# Patient Record
Sex: MALE | Race: WHITE | Employment: OTHER | URBAN - METROPOLITAN AREA
[De-identification: names, ages, dates, MRNs, and addresses within clinical notes are randomized per-mention and may not be internally consistent; named-entity substitution may affect disease eponyms.]

---

## 2019-01-01 ENCOUNTER — OFFICE VISIT (OUTPATIENT)
Dept: GERIATRIC MEDICINE | Age: 69
End: 2019-01-01
Payer: MEDICARE

## 2019-01-01 ENCOUNTER — APPOINTMENT (OUTPATIENT)
Dept: INTERVENTIONAL RADIOLOGY/VASCULAR | Age: 69
DRG: 871 | End: 2019-01-01
Attending: INTERNAL MEDICINE
Payer: MEDICARE

## 2019-01-01 ENCOUNTER — HOSPITAL ENCOUNTER (INPATIENT)
Age: 69
LOS: 7 days | Discharge: NURSING, REHAB OR LONG-TERM CARE | DRG: 871 | End: 2019-02-27
Attending: INTERNAL MEDICINE | Admitting: INTERNAL MEDICINE
Payer: MEDICARE

## 2019-01-01 ENCOUNTER — APPOINTMENT (OUTPATIENT)
Dept: GENERAL RADIOLOGY | Age: 69
End: 2019-01-01
Payer: MEDICARE

## 2019-01-01 ENCOUNTER — HOSPITAL ENCOUNTER (EMERGENCY)
Age: 69
Discharge: STILL ADMITTED OR ADMINISTRATIVE | End: 2019-02-20
Attending: EMERGENCY MEDICINE
Payer: MEDICARE

## 2019-01-01 ENCOUNTER — HOSPITAL ENCOUNTER (INPATIENT)
Age: 69
LOS: 6 days | Discharge: HOME | DRG: 699 | End: 2019-04-08
Attending: EMERGENCY MEDICINE | Admitting: INTERNAL MEDICINE
Payer: MEDICARE

## 2019-01-01 ENCOUNTER — APPOINTMENT (OUTPATIENT)
Dept: GENERAL RADIOLOGY | Age: 69
DRG: 871 | End: 2019-01-01
Attending: INTERNAL MEDICINE
Payer: MEDICARE

## 2019-01-01 ENCOUNTER — HOSPITAL ENCOUNTER (EMERGENCY)
Age: 69
Discharge: HOME | End: 2019-04-18
Attending: EMERGENCY MEDICINE
Payer: MEDICARE

## 2019-01-01 ENCOUNTER — TELEPHONE (OUTPATIENT)
Dept: OTHER | Facility: CLINIC | Age: 69
End: 2019-01-01

## 2019-01-01 ENCOUNTER — HOSPITAL ENCOUNTER (EMERGENCY)
Age: 69
Discharge: HOME | End: 2019-04-25
Attending: EMERGENCY MEDICINE
Payer: MEDICARE

## 2019-01-01 ENCOUNTER — HOSPITAL ENCOUNTER (EMERGENCY)
Age: 69
Discharge: HOME | End: 2019-04-01
Attending: EMERGENCY MEDICINE
Payer: MEDICARE

## 2019-01-01 ENCOUNTER — HOSPITAL ENCOUNTER (OUTPATIENT)
Dept: INPATIENT UNIT | Age: 69
Discharge: HOME | End: 2019-05-15
Payer: MEDICARE

## 2019-01-01 ENCOUNTER — APPOINTMENT (OUTPATIENT)
Dept: GENERAL RADIOLOGY | Age: 69
DRG: 699 | End: 2019-01-01
Payer: MEDICARE

## 2019-01-01 ENCOUNTER — HOSPITAL ENCOUNTER (EMERGENCY)
Age: 69
End: 2019-07-12
Attending: EMERGENCY MEDICINE
Payer: MEDICARE

## 2019-01-01 ENCOUNTER — HOSPITAL ENCOUNTER (EMERGENCY)
Age: 69
Discharge: HOME | End: 2019-04-09
Attending: INTERNAL MEDICINE
Payer: MEDICARE

## 2019-01-01 ENCOUNTER — TELEPHONE (OUTPATIENT)
Dept: INFECTIOUS DISEASES | Age: 69
End: 2019-01-01

## 2019-01-01 VITALS
WEIGHT: 153.66 LBS | TEMPERATURE: 97.2 F | RESPIRATION RATE: 20 BRPM | SYSTOLIC BLOOD PRESSURE: 142 MMHG | DIASTOLIC BLOOD PRESSURE: 73 MMHG | HEART RATE: 82 BPM | OXYGEN SATURATION: 100 % | HEIGHT: 60 IN | BODY MASS INDEX: 30.17 KG/M2

## 2019-01-01 VITALS
RESPIRATION RATE: 18 BRPM | DIASTOLIC BLOOD PRESSURE: 56 MMHG | OXYGEN SATURATION: 98 % | HEART RATE: 74 BPM | TEMPERATURE: 96.8 F | SYSTOLIC BLOOD PRESSURE: 121 MMHG

## 2019-01-01 VITALS
BODY MASS INDEX: 26.45 KG/M2 | SYSTOLIC BLOOD PRESSURE: 100 MMHG | HEIGHT: 60 IN | OXYGEN SATURATION: 100 % | RESPIRATION RATE: 20 BRPM | TEMPERATURE: 98.1 F | DIASTOLIC BLOOD PRESSURE: 74 MMHG | WEIGHT: 134.7 LBS | HEART RATE: 82 BPM

## 2019-01-01 VITALS — RESPIRATION RATE: 16 BRPM | HEART RATE: 71 BPM | OXYGEN SATURATION: 91 % | TEMPERATURE: 97 F

## 2019-01-01 VITALS
OXYGEN SATURATION: 98 % | HEART RATE: 82 BPM | RESPIRATION RATE: 21 BRPM | DIASTOLIC BLOOD PRESSURE: 89 MMHG | SYSTOLIC BLOOD PRESSURE: 129 MMHG | TEMPERATURE: 98.5 F

## 2019-01-01 VITALS
HEIGHT: 62 IN | DIASTOLIC BLOOD PRESSURE: 61 MMHG | WEIGHT: 140 LBS | SYSTOLIC BLOOD PRESSURE: 109 MMHG | RESPIRATION RATE: 20 BRPM | HEART RATE: 106 BPM | OXYGEN SATURATION: 95 % | TEMPERATURE: 99.3 F | BODY MASS INDEX: 25.76 KG/M2

## 2019-01-01 VITALS
SYSTOLIC BLOOD PRESSURE: 91 MMHG | DIASTOLIC BLOOD PRESSURE: 53 MMHG | WEIGHT: 140 LBS | TEMPERATURE: 100.4 F | HEIGHT: 60 IN | HEART RATE: 93 BPM | BODY MASS INDEX: 27.48 KG/M2 | OXYGEN SATURATION: 99 % | RESPIRATION RATE: 24 BRPM

## 2019-01-01 VITALS
OXYGEN SATURATION: 94 % | HEART RATE: 55 BPM | HEIGHT: 60 IN | DIASTOLIC BLOOD PRESSURE: 72 MMHG | BODY MASS INDEX: 25.23 KG/M2 | RESPIRATION RATE: 18 BRPM | SYSTOLIC BLOOD PRESSURE: 107 MMHG | WEIGHT: 128.5 LBS | TEMPERATURE: 96.5 F

## 2019-01-01 VITALS
DIASTOLIC BLOOD PRESSURE: 75 MMHG | TEMPERATURE: 96.5 F | HEART RATE: 70 BPM | OXYGEN SATURATION: 97 % | RESPIRATION RATE: 18 BRPM | SYSTOLIC BLOOD PRESSURE: 110 MMHG

## 2019-01-01 VITALS
TEMPERATURE: 98.4 F | OXYGEN SATURATION: 98 % | HEART RATE: 77 BPM | SYSTOLIC BLOOD PRESSURE: 100 MMHG | RESPIRATION RATE: 20 BRPM | DIASTOLIC BLOOD PRESSURE: 60 MMHG

## 2019-01-01 VITALS — TEMPERATURE: 91.3 F

## 2019-01-01 DIAGNOSIS — Z93.1 G TUBE FEEDINGS (HCC): ICD-10-CM

## 2019-01-01 DIAGNOSIS — K21.9 GASTROESOPHAGEAL REFLUX DISEASE WITHOUT ESOPHAGITIS: ICD-10-CM

## 2019-01-01 DIAGNOSIS — J32.9 BACTERIAL SINUSITIS: Primary | ICD-10-CM

## 2019-01-01 DIAGNOSIS — G62.9 NEUROPATHY: ICD-10-CM

## 2019-01-01 DIAGNOSIS — Z93.0 PRESENCE OF TRACHEOSTOMY (HCC): ICD-10-CM

## 2019-01-01 DIAGNOSIS — L03.311 ABDOMINAL WALL CELLULITIS: ICD-10-CM

## 2019-01-01 DIAGNOSIS — E78.2 MIXED HYPERLIPIDEMIA: ICD-10-CM

## 2019-01-01 DIAGNOSIS — K21.9 GASTROESOPHAGEAL REFLUX DISEASE, ESOPHAGITIS PRESENCE NOT SPECIFIED: ICD-10-CM

## 2019-01-01 DIAGNOSIS — R06.00 DYSPNEA AND RESPIRATORY ABNORMALITIES: ICD-10-CM

## 2019-01-01 DIAGNOSIS — I10 ESSENTIAL HYPERTENSION: ICD-10-CM

## 2019-01-01 DIAGNOSIS — J18.9 PNEUMONIA DUE TO ORGANISM: Primary | ICD-10-CM

## 2019-01-01 DIAGNOSIS — R09.02 HYPOXIA: ICD-10-CM

## 2019-01-01 DIAGNOSIS — R06.89 DYSPNEA AND RESPIRATORY ABNORMALITIES: ICD-10-CM

## 2019-01-01 DIAGNOSIS — Z86.718 HISTORY OF DVT OF LOWER EXTREMITY: ICD-10-CM

## 2019-01-01 DIAGNOSIS — K59.01 SLOW TRANSIT CONSTIPATION: ICD-10-CM

## 2019-01-01 DIAGNOSIS — J04.10 ACUTE TRACHEITIS WITHOUT AIRWAY OBSTRUCTION: Primary | ICD-10-CM

## 2019-01-01 DIAGNOSIS — G80.1 SPASTIC DIPLEGIC CEREBRAL PALSY (HCC): ICD-10-CM

## 2019-01-01 DIAGNOSIS — H90.3 SENSORINEURAL HEARING LOSS (SNHL) OF BOTH EARS: ICD-10-CM

## 2019-01-01 DIAGNOSIS — J95.00 TRACHEOSTOMY COMPLICATION, UNSPECIFIED COMPLICATION TYPE (HCC): Primary | ICD-10-CM

## 2019-01-01 DIAGNOSIS — R06.00 DYSPNEA, UNSPECIFIED TYPE: Primary | ICD-10-CM

## 2019-01-01 DIAGNOSIS — Z93.1 STATUS POST INSERTION OF PERCUTANEOUS ENDOSCOPIC GASTROSTOMY (PEG) TUBE (HCC): ICD-10-CM

## 2019-01-01 DIAGNOSIS — Z79.2 LONG TERM (CURRENT) USE OF ANTIBIOTICS: ICD-10-CM

## 2019-01-01 DIAGNOSIS — F79 DISORDER OF INTELLECTUAL DEVELOPMENT: Primary | ICD-10-CM

## 2019-01-01 DIAGNOSIS — N30.00 ACUTE CYSTITIS WITHOUT HEMATURIA: ICD-10-CM

## 2019-01-01 DIAGNOSIS — R09.02 HYPOXIA: Primary | ICD-10-CM

## 2019-01-01 DIAGNOSIS — M81.0 AGE-RELATED OSTEOPOROSIS WITHOUT CURRENT PATHOLOGICAL FRACTURE: ICD-10-CM

## 2019-01-01 DIAGNOSIS — Z86.711 PERSONAL HISTORY OF PE (PULMONARY EMBOLISM): ICD-10-CM

## 2019-01-01 DIAGNOSIS — R13.12 OROPHARYNGEAL DYSPHAGIA: ICD-10-CM

## 2019-01-01 DIAGNOSIS — J18.9 PNEUMONIA DUE TO ORGANISM: ICD-10-CM

## 2019-01-01 DIAGNOSIS — Z87.01 HX OF BACTERIAL PNEUMONIA: Primary | ICD-10-CM

## 2019-01-01 DIAGNOSIS — E55.9 VITAMIN D DEFICIENCY: ICD-10-CM

## 2019-01-01 DIAGNOSIS — Z93.0 TRACHEOSTOMY STATUS (HCC): ICD-10-CM

## 2019-01-01 DIAGNOSIS — M41.05 INFANTILE IDIOPATHIC SCOLIOSIS OF THORACOLUMBAR REGION: ICD-10-CM

## 2019-01-01 DIAGNOSIS — Z87.440 HISTORY OF UTI: ICD-10-CM

## 2019-01-01 DIAGNOSIS — N39.0 RECURRENT UTI: Primary | ICD-10-CM

## 2019-01-01 DIAGNOSIS — Z87.09 HX OF BRONCHITIS: ICD-10-CM

## 2019-01-01 DIAGNOSIS — Z93.0 STATUS POST TRACHEOSTOMY (HCC): ICD-10-CM

## 2019-01-01 DIAGNOSIS — Z87.81 STATUS POST CLOSED FRACTURE OF LEFT FEMUR: ICD-10-CM

## 2019-01-01 DIAGNOSIS — Z43.0 TRACHEOSTOMY CARE (HCC): ICD-10-CM

## 2019-01-01 DIAGNOSIS — R50.9 FEBRILE ILLNESS, ACUTE: ICD-10-CM

## 2019-01-01 DIAGNOSIS — Z87.898 HISTORY OF SEIZURES: ICD-10-CM

## 2019-01-01 DIAGNOSIS — J18.9 PNEUMONIA OF BOTH LUNGS DUE TO INFECTIOUS ORGANISM, UNSPECIFIED PART OF LUNG: Primary | ICD-10-CM

## 2019-01-01 DIAGNOSIS — Z98.890 S/P TRACHEOPLASTY: ICD-10-CM

## 2019-01-01 DIAGNOSIS — Z78.9 LACK OF INTRAVENOUS ACCESS: Primary | ICD-10-CM

## 2019-01-01 DIAGNOSIS — G40.909 SEIZURE DISORDER (HCC): ICD-10-CM

## 2019-01-01 DIAGNOSIS — B96.89 BACTERIAL SINUSITIS: Primary | ICD-10-CM

## 2019-01-01 DIAGNOSIS — R13.12 OROPHARYNGEAL DYSPHAGIA: Primary | ICD-10-CM

## 2019-01-01 DIAGNOSIS — G62.9 NEUROPATHY: Primary | ICD-10-CM

## 2019-01-01 LAB
ACANTHOCYTES: 0
ALBUMIN SERPL-MCNC: 3.5 G/DL (ref 3.5–4.6)
ALBUMIN SERPL-MCNC: 3.5 G/DL (ref 3.5–4.6)
ALBUMIN SERPL-MCNC: 3.6 G/DL (ref 3.5–4.6)
ALBUMIN SERPL-MCNC: 3.7 G/DL (ref 3.5–4.6)
ALBUMIN SERPL-MCNC: 3.9 G/DL (ref 3.5–4.6)
ALBUMIN SERPL-MCNC: 4 G/DL (ref 3.5–4.6)
ALP BLD-CCNC: 100 U/L (ref 35–104)
ALP BLD-CCNC: 101 U/L (ref 35–104)
ALP BLD-CCNC: 84 U/L (ref 35–104)
ALT SERPL-CCNC: 16 U/L (ref 0–41)
ALT SERPL-CCNC: 17 U/L (ref 0–41)
ALT SERPL-CCNC: 17 U/L (ref 0–41)
AMORPHOUS: ABNORMAL
ANION GAP SERPL CALCULATED.3IONS-SCNC: 10 MEQ/L (ref 9–15)
ANION GAP SERPL CALCULATED.3IONS-SCNC: 11 MEQ/L (ref 9–15)
ANION GAP SERPL CALCULATED.3IONS-SCNC: 11 MEQ/L (ref 9–15)
ANION GAP SERPL CALCULATED.3IONS-SCNC: 12 MEQ/L (ref 9–15)
ANION GAP SERPL CALCULATED.3IONS-SCNC: 13 MEQ/L (ref 9–15)
ANION GAP SERPL CALCULATED.3IONS-SCNC: 14 MEQ/L (ref 9–15)
ANION GAP SERPL CALCULATED.3IONS-SCNC: 15 MEQ/L (ref 9–15)
ANISOCYTOSIS: 0
AST SERPL-CCNC: 20 U/L (ref 0–40)
AST SERPL-CCNC: 24 U/L (ref 0–40)
AST SERPL-CCNC: 30 U/L (ref 0–40)
AUER RODS: 0
BACTERIA: ABNORMAL /HPF
BACTERIA: ABNORMAL /HPF
BANDED NEUTROPHILS RELATIVE PERCENT: 3 %
BASE EXCESS ARTERIAL: -1 (ref -3–3)
BASE EXCESS ARTERIAL: -1 (ref -3–3)
BASOPHILIC STIPPLING: 0
BASOPHILS ABSOLUTE: 0 K/UL (ref 0–0.2)
BASOPHILS ABSOLUTE: 0.1 K/UL (ref 0–0.2)
BASOPHILS RELATIVE PERCENT: 0.1 %
BASOPHILS RELATIVE PERCENT: 0.2 %
BASOPHILS RELATIVE PERCENT: 0.3 %
BASOPHILS RELATIVE PERCENT: 0.3 %
BASOPHILS RELATIVE PERCENT: 0.5 %
BASOPHILS RELATIVE PERCENT: 0.8 %
BASOPHILS RELATIVE PERCENT: 0.8 %
BASOPHILS RELATIVE PERCENT: 0.9 %
BASOPHILS RELATIVE PERCENT: 0.9 %
BASOPHILS RELATIVE PERCENT: 1 %
BASOPHILS RELATIVE PERCENT: 1.2 %
BILIRUB SERPL-MCNC: <0.2 MG/DL (ref 0.2–0.7)
BILIRUBIN URINE: NEGATIVE
BILIRUBIN URINE: NEGATIVE
BLOOD CULTURE, ROUTINE: NORMAL
BLOOD, URINE: ABNORMAL
BLOOD, URINE: ABNORMAL
BUN BLDV-MCNC: 10 MG/DL (ref 8–23)
BUN BLDV-MCNC: 10 MG/DL (ref 8–23)
BUN BLDV-MCNC: 13 MG/DL (ref 8–23)
BUN BLDV-MCNC: 14 MG/DL (ref 8–23)
BUN BLDV-MCNC: 17 MG/DL (ref 8–23)
BUN BLDV-MCNC: 18 MG/DL (ref 8–23)
BUN BLDV-MCNC: 19 MG/DL (ref 8–23)
BUN BLDV-MCNC: 21 MG/DL (ref 8–23)
BUN BLDV-MCNC: 5 MG/DL (ref 8–23)
BUN BLDV-MCNC: 6 MG/DL (ref 8–23)
BUN BLDV-MCNC: 7 MG/DL (ref 8–23)
BUN BLDV-MCNC: 8 MG/DL (ref 8–23)
BUN BLDV-MCNC: 9 MG/DL (ref 8–23)
BURR CELLS: 0
C-REACTIVE PROTEIN, HIGH SENSITIVITY: 14.3 MG/L (ref 0–5)
CABOT RINGS: 0
CALCIUM IONIZED: 1.06 MMOL/L (ref 1.12–1.32)
CALCIUM IONIZED: 1.06 MMOL/L (ref 1.12–1.32)
CALCIUM SERPL-MCNC: 5.8 MG/DL (ref 8.5–9.9)
CALCIUM SERPL-MCNC: 6.8 MG/DL (ref 8.5–9.9)
CALCIUM SERPL-MCNC: 7.2 MG/DL (ref 8.5–9.9)
CALCIUM SERPL-MCNC: 7.2 MG/DL (ref 8.5–9.9)
CALCIUM SERPL-MCNC: 7.6 MG/DL (ref 8.5–9.9)
CALCIUM SERPL-MCNC: 7.7 MG/DL (ref 8.5–9.9)
CALCIUM SERPL-MCNC: 7.7 MG/DL (ref 8.5–9.9)
CALCIUM SERPL-MCNC: 7.8 MG/DL (ref 8.5–9.9)
CALCIUM SERPL-MCNC: 8.3 MG/DL (ref 8.5–9.9)
CALCIUM SERPL-MCNC: 8.3 MG/DL (ref 8.5–9.9)
CALCIUM SERPL-MCNC: 8.4 MG/DL (ref 8.5–9.9)
CALCIUM SERPL-MCNC: 8.5 MG/DL (ref 8.5–9.9)
CALCIUM SERPL-MCNC: 8.6 MG/DL (ref 8.5–9.9)
CALCIUM SERPL-MCNC: 8.7 MG/DL (ref 8.5–9.9)
CALCIUM SERPL-MCNC: 9 MG/DL (ref 8.5–9.9)
CALCIUM SERPL-MCNC: 9.4 MG/DL (ref 8.5–9.9)
CALCIUM SERPL-MCNC: 9.6 MG/DL (ref 8.5–9.9)
CHLORIDE BLD-SCNC: 100 MEQ/L (ref 95–107)
CHLORIDE BLD-SCNC: 101 MEQ/L (ref 95–107)
CHLORIDE BLD-SCNC: 101 MEQ/L (ref 95–107)
CHLORIDE BLD-SCNC: 102 MEQ/L (ref 95–107)
CHLORIDE BLD-SCNC: 102 MEQ/L (ref 95–107)
CHLORIDE BLD-SCNC: 103 MEQ/L (ref 95–107)
CHLORIDE BLD-SCNC: 105 MEQ/L (ref 95–107)
CHLORIDE BLD-SCNC: 106 MEQ/L (ref 95–107)
CHLORIDE BLD-SCNC: 114 MEQ/L (ref 95–107)
CHLORIDE BLD-SCNC: 98 MEQ/L (ref 95–107)
CHLORIDE BLD-SCNC: 99 MEQ/L (ref 95–107)
CHLORIDE BLD-SCNC: 99 MEQ/L (ref 95–107)
CLARITY: ABNORMAL
CLARITY: CLEAR
CO2: 21 MEQ/L (ref 20–31)
CO2: 22 MEQ/L (ref 20–31)
CO2: 23 MEQ/L (ref 20–31)
CO2: 25 MEQ/L (ref 20–31)
CO2: 25 MEQ/L (ref 20–31)
CO2: 26 MEQ/L (ref 20–31)
CO2: 26 MEQ/L (ref 20–31)
CO2: 27 MEQ/L (ref 20–31)
CO2: 28 MEQ/L (ref 20–31)
CO2: 29 MEQ/L (ref 20–31)
CO2: 29 MEQ/L (ref 20–31)
CO2: 30 MEQ/L (ref 20–31)
CO2: 30 MEQ/L (ref 20–31)
CO2: 31 MEQ/L (ref 20–31)
COLOR: YELLOW
COLOR: YELLOW
CREAT SERPL-MCNC: 0.22 MG/DL (ref 0.7–1.2)
CREAT SERPL-MCNC: 0.27 MG/DL (ref 0.7–1.2)
CREAT SERPL-MCNC: 0.28 MG/DL (ref 0.7–1.2)
CREAT SERPL-MCNC: 0.28 MG/DL (ref 0.7–1.2)
CREAT SERPL-MCNC: 0.3 MG/DL (ref 0.7–1.2)
CREAT SERPL-MCNC: 0.32 MG/DL (ref 0.7–1.2)
CREAT SERPL-MCNC: 0.33 MG/DL (ref 0.7–1.2)
CREAT SERPL-MCNC: 0.34 MG/DL (ref 0.7–1.2)
CREAT SERPL-MCNC: 0.35 MG/DL (ref 0.7–1.2)
CREAT SERPL-MCNC: 0.36 MG/DL (ref 0.7–1.2)
CREAT SERPL-MCNC: 0.37 MG/DL (ref 0.7–1.2)
CREAT SERPL-MCNC: 0.38 MG/DL (ref 0.7–1.2)
CREAT SERPL-MCNC: 0.46 MG/DL (ref 0.7–1.2)
CREAT SERPL-MCNC: 0.48 MG/DL (ref 0.7–1.2)
CULTURE, BLOOD 2: NORMAL
CULTURE, BLOOD 2: NORMAL
CULTURE, RESPIRATORY: ABNORMAL
DOHLE BODIES: 0
EKG ATRIAL RATE: 65 BPM
EKG ATRIAL RATE: 71 BPM
EKG ATRIAL RATE: 77 BPM
EKG P AXIS: 40 DEGREES
EKG P AXIS: 44 DEGREES
EKG P AXIS: 6 DEGREES
EKG P-R INTERVAL: 158 MS
EKG P-R INTERVAL: 160 MS
EKG P-R INTERVAL: 176 MS
EKG Q-T INTERVAL: 376 MS
EKG Q-T INTERVAL: 414 MS
EKG Q-T INTERVAL: 426 MS
EKG QRS DURATION: 66 MS
EKG QRS DURATION: 72 MS
EKG QRS DURATION: 72 MS
EKG QTC CALCULATION (BAZETT): 425 MS
EKG QTC CALCULATION (BAZETT): 443 MS
EKG QTC CALCULATION (BAZETT): 449 MS
EKG R AXIS: -2 DEGREES
EKG R AXIS: -2 DEGREES
EKG R AXIS: 28 DEGREES
EKG T AXIS: 18 DEGREES
EKG T AXIS: 41 DEGREES
EKG T AXIS: 87 DEGREES
EKG VENTRICULAR RATE: 65 BPM
EKG VENTRICULAR RATE: 71 BPM
EKG VENTRICULAR RATE: 77 BPM
EOSINOPHILS ABSOLUTE: 0 K/UL (ref 0–0.7)
EOSINOPHILS ABSOLUTE: 0 K/UL (ref 0–0.7)
EOSINOPHILS ABSOLUTE: 0.1 K/UL (ref 0–0.7)
EOSINOPHILS ABSOLUTE: 0.1 K/UL (ref 0–0.7)
EOSINOPHILS ABSOLUTE: 0.3 K/UL (ref 0–0.7)
EOSINOPHILS ABSOLUTE: 0.4 K/UL (ref 0–0.7)
EOSINOPHILS ABSOLUTE: 0.6 K/UL (ref 0–0.7)
EOSINOPHILS RELATIVE PERCENT: 0.3 %
EOSINOPHILS RELATIVE PERCENT: 0.3 %
EOSINOPHILS RELATIVE PERCENT: 0.4 %
EOSINOPHILS RELATIVE PERCENT: 1 %
EOSINOPHILS RELATIVE PERCENT: 3.6 %
EOSINOPHILS RELATIVE PERCENT: 3.8 %
EOSINOPHILS RELATIVE PERCENT: 4.4 %
EOSINOPHILS RELATIVE PERCENT: 4.4 %
EOSINOPHILS RELATIVE PERCENT: 5.2 %
EOSINOPHILS RELATIVE PERCENT: 5.2 %
EOSINOPHILS RELATIVE PERCENT: 5.5 %
EPITHELIAL CELLS, UA: ABNORMAL /HPF
EPITHELIAL CELLS, UA: ABNORMAL /HPF (ref 0–5)
GFR AFRICAN AMERICAN: >60
GFR NON-AFRICAN AMERICAN: >60
GLOBULIN: 2.5 G/DL (ref 2.3–3.5)
GLOBULIN: 3.4 G/DL (ref 2.3–3.5)
GLOBULIN: 3.4 G/DL (ref 2.3–3.5)
GLUCOSE BLD-MCNC: 115 MG/DL (ref 70–99)
GLUCOSE BLD-MCNC: 120 MG/DL (ref 70–99)
GLUCOSE BLD-MCNC: 120 MG/DL (ref 70–99)
GLUCOSE BLD-MCNC: 121 MG/DL (ref 70–99)
GLUCOSE BLD-MCNC: 122 MG/DL (ref 70–99)
GLUCOSE BLD-MCNC: 126 MG/DL (ref 60–115)
GLUCOSE BLD-MCNC: 127 MG/DL (ref 70–99)
GLUCOSE BLD-MCNC: 128 MG/DL (ref 70–99)
GLUCOSE BLD-MCNC: 129 MG/DL (ref 60–115)
GLUCOSE BLD-MCNC: 130 MG/DL (ref 70–99)
GLUCOSE BLD-MCNC: 132 MG/DL (ref 70–99)
GLUCOSE BLD-MCNC: 136 MG/DL (ref 70–99)
GLUCOSE BLD-MCNC: 140 MG/DL (ref 70–99)
GLUCOSE BLD-MCNC: 148 MG/DL (ref 60–115)
GLUCOSE BLD-MCNC: 152 MG/DL (ref 70–99)
GLUCOSE BLD-MCNC: 171 MG/DL (ref 60–115)
GLUCOSE BLD-MCNC: 85 MG/DL (ref 70–99)
GLUCOSE BLD-MCNC: 92 MG/DL (ref 60–115)
GLUCOSE BLD-MCNC: 92 MG/DL (ref 70–99)
GLUCOSE BLD-MCNC: 92 MG/DL (ref 70–99)
GLUCOSE BLD-MCNC: 96 MG/DL (ref 70–99)
GLUCOSE BLD-MCNC: 99 MG/DL (ref 70–99)
GLUCOSE URINE: NEGATIVE MG/DL
GLUCOSE URINE: NEGATIVE MG/DL
GRAM STAIN RESULT: ABNORMAL
GRAM STAIN RESULT: ABNORMAL
HAIRY CELLS: 0
HCO3 ARTERIAL: 24.1 MMOL/L (ref 21–29)
HCO3 ARTERIAL: 24.6 MMOL/L (ref 21–29)
HCT VFR BLD CALC: 29 % (ref 42–52)
HCT VFR BLD CALC: 31.9 % (ref 42–52)
HCT VFR BLD CALC: 32.2 % (ref 42–52)
HCT VFR BLD CALC: 32.4 % (ref 42–52)
HCT VFR BLD CALC: 32.7 % (ref 42–52)
HCT VFR BLD CALC: 32.8 % (ref 42–52)
HCT VFR BLD CALC: 33.3 % (ref 42–52)
HCT VFR BLD CALC: 34 % (ref 42–52)
HCT VFR BLD CALC: 34.2 % (ref 42–52)
HCT VFR BLD CALC: 34.6 % (ref 42–52)
HCT VFR BLD CALC: 34.9 % (ref 42–52)
HCT VFR BLD CALC: 35 % (ref 42–52)
HCT VFR BLD CALC: 36.3 % (ref 42–52)
HCT VFR BLD CALC: 36.8 % (ref 42–52)
HCT VFR BLD CALC: 37.8 % (ref 42–52)
HCT VFR BLD CALC: 38.6 % (ref 42–52)
HEMOGLOBIN: 10.9 G/DL (ref 14–18)
HEMOGLOBIN: 10.9 G/DL (ref 14–18)
HEMOGLOBIN: 11 G/DL (ref 14–18)
HEMOGLOBIN: 11 G/DL (ref 14–18)
HEMOGLOBIN: 11.1 G/DL (ref 14–18)
HEMOGLOBIN: 11.2 G/DL (ref 14–18)
HEMOGLOBIN: 11.4 G/DL (ref 14–18)
HEMOGLOBIN: 11.5 GM/DL (ref 13.5–17.5)
HEMOGLOBIN: 11.6 G/DL (ref 14–18)
HEMOGLOBIN: 11.6 G/DL (ref 14–18)
HEMOGLOBIN: 11.7 G/DL (ref 14–18)
HEMOGLOBIN: 11.8 G/DL (ref 14–18)
HEMOGLOBIN: 11.8 GM/DL (ref 13.5–17.5)
HEMOGLOBIN: 12.3 G/DL (ref 14–18)
HEMOGLOBIN: 12.4 G/DL (ref 14–18)
HEMOGLOBIN: 12.9 G/DL (ref 14–18)
HEMOGLOBIN: 13 G/DL (ref 14–18)
HEMOGLOBIN: 9.9 G/DL (ref 14–18)
HOWELL-JOLLY BODIES: 0
HYALINE CASTS: ABNORMAL /HPF (ref 0–5)
HYPERSEGMENTED NEUTROPHILS: 0
HYPOCHROMIA: 0
KETONES, URINE: NEGATIVE MG/DL
KETONES, URINE: NEGATIVE MG/DL
LACTATE: 0.74 MMOL/L (ref 0.4–2)
LACTATE: 0.94 MMOL/L (ref 0.4–2)
LACTIC ACID, SEPSIS: 0.4 MMOL/L (ref 0.5–1.9)
LACTIC ACID, SEPSIS: 0.6 MMOL/L (ref 0.5–1.9)
LACTIC ACID: 0.6 MMOL/L (ref 0.5–2.2)
LACTIC ACID: 1.9 MMOL/L (ref 0.5–2.2)
LEUKOCYTE ESTERASE, URINE: ABNORMAL
LEUKOCYTE ESTERASE, URINE: NEGATIVE
LYMPHOCYTES ABSOLUTE: 0.6 K/UL (ref 1–4.8)
LYMPHOCYTES ABSOLUTE: 0.9 K/UL (ref 1–4.8)
LYMPHOCYTES ABSOLUTE: 1 K/UL (ref 1–4.8)
LYMPHOCYTES ABSOLUTE: 1.1 K/UL (ref 1–4.8)
LYMPHOCYTES ABSOLUTE: 1.1 K/UL (ref 1–4.8)
LYMPHOCYTES ABSOLUTE: 1.2 K/UL (ref 1–4.8)
LYMPHOCYTES ABSOLUTE: 1.7 K/UL (ref 1–4.8)
LYMPHOCYTES ABSOLUTE: 2.2 K/UL (ref 1–4.8)
LYMPHOCYTES ABSOLUTE: 2.5 K/UL (ref 1–4.8)
LYMPHOCYTES ABSOLUTE: 2.9 K/UL (ref 1–4.8)
LYMPHOCYTES ABSOLUTE: 3 K/UL (ref 1–4.8)
LYMPHOCYTES RELATIVE PERCENT: 11.1 %
LYMPHOCYTES RELATIVE PERCENT: 15.7 %
LYMPHOCYTES RELATIVE PERCENT: 19.3 %
LYMPHOCYTES RELATIVE PERCENT: 19.5 %
LYMPHOCYTES RELATIVE PERCENT: 20.5 %
LYMPHOCYTES RELATIVE PERCENT: 22.2 %
LYMPHOCYTES RELATIVE PERCENT: 26.6 %
LYMPHOCYTES RELATIVE PERCENT: 27 %
LYMPHOCYTES RELATIVE PERCENT: 4.7 %
LYMPHOCYTES RELATIVE PERCENT: 5 %
LYMPHOCYTES RELATIVE PERCENT: 6.5 %
MACROCYTES: 0
MAGNESIUM: 1.7 MG/DL (ref 1.7–2.4)
MAGNESIUM: 2 MG/DL (ref 1.7–2.4)
MAGNESIUM: 2 MG/DL (ref 1.7–2.4)
MAGNESIUM: 2.1 MG/DL (ref 1.7–2.4)
MAGNESIUM: 2.3 MG/DL (ref 1.7–2.4)
MAGNESIUM: 2.3 MG/DL (ref 1.7–2.4)
MAGNESIUM: 2.4 MG/DL (ref 1.7–2.4)
MCH RBC QN AUTO: 30.9 PG (ref 27–31.3)
MCH RBC QN AUTO: 31.2 PG (ref 27–31.3)
MCH RBC QN AUTO: 31.3 PG (ref 27–31.3)
MCH RBC QN AUTO: 31.3 PG (ref 27–31.3)
MCH RBC QN AUTO: 31.4 PG (ref 27–31.3)
MCH RBC QN AUTO: 31.4 PG (ref 27–31.3)
MCH RBC QN AUTO: 31.5 PG (ref 27–31.3)
MCH RBC QN AUTO: 31.7 PG (ref 27–31.3)
MCH RBC QN AUTO: 31.7 PG (ref 27–31.3)
MCH RBC QN AUTO: 31.8 PG (ref 27–31.3)
MCH RBC QN AUTO: 31.9 PG (ref 27–31.3)
MCH RBC QN AUTO: 31.9 PG (ref 27–31.3)
MCH RBC QN AUTO: 32.1 PG (ref 27–31.3)
MCHC RBC AUTO-ENTMCNC: 32.8 % (ref 33–37)
MCHC RBC AUTO-ENTMCNC: 33.5 % (ref 33–37)
MCHC RBC AUTO-ENTMCNC: 33.5 % (ref 33–37)
MCHC RBC AUTO-ENTMCNC: 33.6 % (ref 33–37)
MCHC RBC AUTO-ENTMCNC: 33.7 % (ref 33–37)
MCHC RBC AUTO-ENTMCNC: 33.9 % (ref 33–37)
MCHC RBC AUTO-ENTMCNC: 34.1 % (ref 33–37)
MCHC RBC AUTO-ENTMCNC: 34.1 % (ref 33–37)
MCHC RBC AUTO-ENTMCNC: 34.2 % (ref 33–37)
MCHC RBC AUTO-ENTMCNC: 35 % (ref 33–37)
MCV RBC AUTO: 91.7 FL (ref 80–100)
MCV RBC AUTO: 91.8 FL (ref 80–100)
MCV RBC AUTO: 92.8 FL (ref 80–100)
MCV RBC AUTO: 93 FL (ref 80–100)
MCV RBC AUTO: 93.2 FL (ref 80–100)
MCV RBC AUTO: 93.3 FL (ref 80–100)
MCV RBC AUTO: 93.4 FL (ref 80–100)
MCV RBC AUTO: 93.5 FL (ref 80–100)
MCV RBC AUTO: 93.7 FL (ref 80–100)
MCV RBC AUTO: 94 FL (ref 80–100)
MCV RBC AUTO: 96 FL (ref 80–100)
MICROCYTES: 0
MONOCYTES ABSOLUTE: 1.1 K/UL (ref 0.2–0.8)
MONOCYTES ABSOLUTE: 1.2 K/UL (ref 0.2–0.8)
MONOCYTES ABSOLUTE: 1.2 K/UL (ref 0.2–0.8)
MONOCYTES ABSOLUTE: 1.3 K/UL (ref 0.2–0.8)
MONOCYTES ABSOLUTE: 1.3 K/UL (ref 0.2–0.8)
MONOCYTES ABSOLUTE: 1.5 K/UL (ref 0.2–0.8)
MONOCYTES ABSOLUTE: 1.5 K/UL (ref 0.2–0.8)
MONOCYTES ABSOLUTE: 1.6 K/UL (ref 0.2–0.8)
MONOCYTES ABSOLUTE: 1.9 K/UL (ref 0.2–0.8)
MONOCYTES RELATIVE PERCENT: 10.3 %
MONOCYTES RELATIVE PERCENT: 10.9 %
MONOCYTES RELATIVE PERCENT: 11.1 %
MONOCYTES RELATIVE PERCENT: 11.3 %
MONOCYTES RELATIVE PERCENT: 12 %
MONOCYTES RELATIVE PERCENT: 13.6 %
MONOCYTES RELATIVE PERCENT: 14.8 %
MONOCYTES RELATIVE PERCENT: 15.4 %
MONOCYTES RELATIVE PERCENT: 15.5 %
MONOCYTES RELATIVE PERCENT: 18.8 %
MONOCYTES RELATIVE PERCENT: 7.1 %
NEUTROPHILS ABSOLUTE: 10.9 K/UL (ref 1.4–6.5)
NEUTROPHILS ABSOLUTE: 13.2 K/UL (ref 1.4–6.5)
NEUTROPHILS ABSOLUTE: 14.7 K/UL (ref 1.4–6.5)
NEUTROPHILS ABSOLUTE: 3.5 K/UL (ref 1.4–6.5)
NEUTROPHILS ABSOLUTE: 4.3 K/UL (ref 1.4–6.5)
NEUTROPHILS ABSOLUTE: 4.7 K/UL (ref 1.4–6.5)
NEUTROPHILS ABSOLUTE: 5.9 K/UL (ref 1.4–6.5)
NEUTROPHILS ABSOLUTE: 6.4 K/UL (ref 1.4–6.5)
NEUTROPHILS ABSOLUTE: 6.6 K/UL (ref 1.4–6.5)
NEUTROPHILS ABSOLUTE: 7 K/UL (ref 1.4–6.5)
NEUTROPHILS ABSOLUTE: 8.2 K/UL (ref 1.4–6.5)
NEUTROPHILS RELATIVE PERCENT: 55.7 %
NEUTROPHILS RELATIVE PERCENT: 55.8 %
NEUTROPHILS RELATIVE PERCENT: 55.8 %
NEUTROPHILS RELATIVE PERCENT: 57.9 %
NEUTROPHILS RELATIVE PERCENT: 61.6 %
NEUTROPHILS RELATIVE PERCENT: 64.8 %
NEUTROPHILS RELATIVE PERCENT: 64.8 %
NEUTROPHILS RELATIVE PERCENT: 72.1 %
NEUTROPHILS RELATIVE PERCENT: 82 %
NEUTROPHILS RELATIVE PERCENT: 82.8 %
NEUTROPHILS RELATIVE PERCENT: 85.9 %
NITRITE, URINE: NEGATIVE
NITRITE, URINE: POSITIVE
O2 SAT, ARTERIAL: 92 % (ref 93–100)
O2 SAT, ARTERIAL: 94 % (ref 93–100)
ORGANISM: ABNORMAL
OVALOCYTES: 0
PAPPENHEIMER BODIES: 0
PCO2 ARTERIAL: 43 MM HG (ref 35–45)
PCO2 ARTERIAL: 45 MM HG (ref 35–45)
PDW BLD-RTO: 12.9 % (ref 11.5–14.5)
PDW BLD-RTO: 13 % (ref 11.5–14.5)
PDW BLD-RTO: 13.1 % (ref 11.5–14.5)
PDW BLD-RTO: 13.1 % (ref 11.5–14.5)
PDW BLD-RTO: 13.2 % (ref 11.5–14.5)
PDW BLD-RTO: 13.4 % (ref 11.5–14.5)
PDW BLD-RTO: 13.5 % (ref 11.5–14.5)
PDW BLD-RTO: 13.6 % (ref 11.5–14.5)
PDW BLD-RTO: 13.7 % (ref 11.5–14.5)
PDW BLD-RTO: 13.8 % (ref 11.5–14.5)
PELGER HUET CELLS: 0 %
PERFORMED ON: ABNORMAL
PERFORMED ON: NORMAL
PH ARTERIAL: 7.34 (ref 7.35–7.45)
PH ARTERIAL: 7.36 (ref 7.35–7.45)
PH UA: 7.5 (ref 5–9)
PH UA: 8.5 (ref 5–9)
PHOSPHORUS: 2.3 MG/DL (ref 2.3–4.8)
PHOSPHORUS: 2.4 MG/DL (ref 2.3–4.8)
PHOSPHORUS: 2.4 MG/DL (ref 2.3–4.8)
PHOSPHORUS: 2.5 MG/DL (ref 2.3–4.8)
PHOSPHORUS: 3 MG/DL (ref 2.3–4.8)
PLATELET # BLD: 114 K/UL (ref 130–400)
PLATELET # BLD: 122 K/UL (ref 130–400)
PLATELET # BLD: 147 K/UL (ref 130–400)
PLATELET # BLD: 190 K/UL (ref 130–400)
PLATELET # BLD: 195 K/UL (ref 130–400)
PLATELET # BLD: 219 K/UL (ref 130–400)
PLATELET # BLD: 227 K/UL (ref 130–400)
PLATELET # BLD: 238 K/UL (ref 130–400)
PLATELET # BLD: 239 K/UL (ref 130–400)
PLATELET # BLD: 241 K/UL (ref 130–400)
PLATELET # BLD: 245 K/UL (ref 130–400)
PLATELET # BLD: 249 K/UL (ref 130–400)
PLATELET # BLD: 257 K/UL (ref 130–400)
PLATELET # BLD: 275 K/UL (ref 130–400)
PLATELET # BLD: 278 K/UL (ref 130–400)
PLATELET # BLD: 312 K/UL (ref 130–400)
PLATELET SLIDE REVIEW: ADEQUATE
PO2 ARTERIAL: 69 MM HG (ref 75–108)
PO2 ARTERIAL: 72 MM HG (ref 75–108)
POC CHLORIDE: 106 MEQ/L (ref 99–110)
POC CHLORIDE: 108 MEQ/L (ref 99–110)
POC CREATININE: <0.3 MG/DL (ref 0.8–1.3)
POC CREATININE: <0.3 MG/DL (ref 0.8–1.3)
POC FIO2: 40
POC FIO2: 40
POC HEMATOCRIT: 34 % (ref 41–53)
POC HEMATOCRIT: 35 % (ref 41–53)
POC POTASSIUM: 3.5 MEQ/L (ref 3.5–5.1)
POC POTASSIUM: 3.5 MEQ/L (ref 3.5–5.1)
POC SAMPLE TYPE: ABNORMAL
POC SAMPLE TYPE: ABNORMAL
POC SODIUM: 140 MEQ/L (ref 136–145)
POC SODIUM: 141 MEQ/L (ref 136–145)
POIKILOCYTES: 0
POLYCHROMASIA: 0
POTASSIUM REFLEX MAGNESIUM: 2.7 MEQ/L (ref 3.4–4.9)
POTASSIUM REFLEX MAGNESIUM: 3 MEQ/L (ref 3.4–4.9)
POTASSIUM REFLEX MAGNESIUM: 3.4 MEQ/L (ref 3.4–4.9)
POTASSIUM REFLEX MAGNESIUM: 3.5 MEQ/L (ref 3.4–4.9)
POTASSIUM REFLEX MAGNESIUM: 3.6 MEQ/L (ref 3.4–4.9)
POTASSIUM REFLEX MAGNESIUM: 3.9 MEQ/L (ref 3.4–4.9)
POTASSIUM REFLEX MAGNESIUM: 4.2 MEQ/L (ref 3.4–4.9)
POTASSIUM SERPL-SCNC: 3.7 MEQ/L (ref 3.4–4.9)
POTASSIUM SERPL-SCNC: 3.9 MEQ/L (ref 3.4–4.9)
POTASSIUM SERPL-SCNC: 4 MEQ/L (ref 3.4–4.9)
POTASSIUM SERPL-SCNC: 4 MEQ/L (ref 3.4–4.9)
POTASSIUM SERPL-SCNC: 4.5 MEQ/L (ref 3.4–4.9)
POTASSIUM SERPL-SCNC: 5 MEQ/L (ref 3.4–4.9)
PRO-BNP: 486 PG/ML
PROCALCITONIN: 0.05 NG/ML (ref 0–0.15)
PROTEIN UA: ABNORMAL MG/DL
PROTEIN UA: NEGATIVE MG/DL
RAPID INFLUENZA  B AGN: NEGATIVE
RAPID INFLUENZA A AGN: NEGATIVE
RBC # BLD: 3.12 M/UL (ref 4.7–6.1)
RBC # BLD: 3.42 M/UL (ref 4.7–6.1)
RBC # BLD: 3.47 M/UL (ref 4.7–6.1)
RBC # BLD: 3.48 M/UL (ref 4.7–6.1)
RBC # BLD: 3.48 M/UL (ref 4.7–6.1)
RBC # BLD: 3.52 M/UL (ref 4.7–6.1)
RBC # BLD: 3.56 M/UL (ref 4.7–6.1)
RBC # BLD: 3.63 M/UL (ref 4.7–6.1)
RBC # BLD: 3.67 M/UL (ref 4.7–6.1)
RBC # BLD: 3.7 M/UL (ref 4.7–6.1)
RBC # BLD: 3.72 M/UL (ref 4.7–6.1)
RBC # BLD: 3.74 M/UL (ref 4.7–6.1)
RBC # BLD: 3.94 M/UL (ref 4.7–6.1)
RBC # BLD: 3.96 M/UL (ref 4.7–6.1)
RBC # BLD: 4.01 M/UL (ref 4.7–6.1)
RBC # BLD: 4.14 M/UL (ref 4.7–6.1)
RBC # BLD: NORMAL 10*6/UL
RBC UA: ABNORMAL /HPF (ref 0–2)
RBC UA: ABNORMAL /HPF (ref 0–5)
SCHISTOCYTES: 0
SICKLE CELLS: 0
SLIDE REVIEW: ABNORMAL
SODIUM BLD-SCNC: 135 MEQ/L (ref 135–144)
SODIUM BLD-SCNC: 138 MEQ/L (ref 135–144)
SODIUM BLD-SCNC: 140 MEQ/L (ref 135–144)
SODIUM BLD-SCNC: 141 MEQ/L (ref 135–144)
SODIUM BLD-SCNC: 142 MEQ/L (ref 135–144)
SODIUM BLD-SCNC: 143 MEQ/L (ref 135–144)
SODIUM BLD-SCNC: 144 MEQ/L (ref 135–144)
SODIUM BLD-SCNC: 144 MEQ/L (ref 135–144)
SODIUM BLD-SCNC: 148 MEQ/L (ref 135–144)
SPECIFIC GRAVITY UA: 1.01 (ref 1–1.03)
SPECIFIC GRAVITY UA: 1.02 (ref 1–1.03)
SPHEROCYTES: 0
STOMATOCYTES: 0
TARGET CELLS: 0
TCO2 ARTERIAL: 25 (ref 22–29)
TCO2 ARTERIAL: 26 (ref 22–29)
TEAR DROP CELLS: 0
TOTAL PROTEIN: 6.2 G/DL (ref 6.3–8)
TOTAL PROTEIN: 7.3 G/DL (ref 6.3–8)
TOTAL PROTEIN: 7.4 G/DL (ref 6.3–8)
TOXIC GRANULATION: 0
TROPONIN: <0.01 NG/ML (ref 0–0.01)
TROPONIN: <0.01 NG/ML (ref 0–0.01)
URINE CULTURE, ROUTINE: ABNORMAL
URINE REFLEX TO CULTURE: YES
URINE TYPE: ABNORMAL
UROBILINOGEN, URINE: 0.2 E.U./DL
UROBILINOGEN, URINE: 0.2 E.U./DL
VACUOLATED NEUTROPHILS: 0
VANCOMYCIN TROUGH: 10.3 UG/ML (ref 10–20)
WBC # BLD: 10.6 K/UL (ref 4.8–10.8)
WBC # BLD: 10.7 K/UL (ref 4.8–10.8)
WBC # BLD: 11.1 K/UL (ref 4.8–10.8)
WBC # BLD: 11.5 K/UL (ref 4.8–10.8)
WBC # BLD: 12.7 K/UL (ref 4.8–10.8)
WBC # BLD: 13.1 K/UL (ref 4.8–10.8)
WBC # BLD: 15.4 K/UL (ref 4.8–10.8)
WBC # BLD: 17.3 K/UL (ref 4.8–10.8)
WBC # BLD: 6.3 K/UL (ref 4.8–10.8)
WBC # BLD: 7.3 K/UL (ref 4.8–10.8)
WBC # BLD: 7.5 K/UL (ref 4.8–10.8)
WBC # BLD: 8.5 K/UL (ref 4.8–10.8)
WBC # BLD: 8.6 K/UL (ref 4.8–10.8)
WBC # BLD: 8.8 K/UL (ref 4.8–10.8)
WBC # BLD: 9.7 K/UL (ref 4.8–10.8)
WBC # BLD: 9.8 K/UL (ref 4.8–10.8)
WBC UA: >100 /HPF (ref 0–5)
WBC UA: ABNORMAL /HPF (ref 0–5)

## 2019-01-01 PROCEDURE — 94667 MNPJ CHEST WALL 1ST: CPT

## 2019-01-01 PROCEDURE — 99284 EMERGENCY DEPT VISIT MOD MDM: CPT

## 2019-01-01 PROCEDURE — 82565 ASSAY OF CREATININE: CPT

## 2019-01-01 PROCEDURE — 6370000000 HC RX 637 (ALT 250 FOR IP): Performed by: INTERNAL MEDICINE

## 2019-01-01 PROCEDURE — 1123F ACP DISCUSS/DSCN MKR DOCD: CPT | Performed by: PHYSICIAN ASSISTANT

## 2019-01-01 PROCEDURE — 94760 N-INVAS EAR/PLS OXIMETRY 1: CPT

## 2019-01-01 PROCEDURE — 99232 SBSQ HOSP IP/OBS MODERATE 35: CPT | Performed by: INTERNAL MEDICINE

## 2019-01-01 PROCEDURE — 6360000002 HC RX W HCPCS: Performed by: INTERNAL MEDICINE

## 2019-01-01 PROCEDURE — 99291 CRITICAL CARE FIRST HOUR: CPT | Performed by: INTERNAL MEDICINE

## 2019-01-01 PROCEDURE — 2580000003 HC RX 258: Performed by: INTERNAL MEDICINE

## 2019-01-01 PROCEDURE — 6370000000 HC RX 637 (ALT 250 FOR IP): Performed by: PHYSICIAN ASSISTANT

## 2019-01-01 PROCEDURE — 83735 ASSAY OF MAGNESIUM: CPT

## 2019-01-01 PROCEDURE — 94669 MECHANICAL CHEST WALL OSCILL: CPT

## 2019-01-01 PROCEDURE — 94640 AIRWAY INHALATION TREATMENT: CPT

## 2019-01-01 PROCEDURE — 2500000003 HC RX 250 WO HCPCS: Performed by: INTERNAL MEDICINE

## 2019-01-01 PROCEDURE — 85025 COMPLETE CBC W/AUTO DIFF WBC: CPT

## 2019-01-01 PROCEDURE — 6360000002 HC RX W HCPCS: Performed by: PHYSICIAN ASSISTANT

## 2019-01-01 PROCEDURE — 81001 URINALYSIS AUTO W/SCOPE: CPT

## 2019-01-01 PROCEDURE — 2700000000 HC OXYGEN THERAPY PER DAY

## 2019-01-01 PROCEDURE — 99285 EMERGENCY DEPT VISIT HI MDM: CPT

## 2019-01-01 PROCEDURE — 80069 RENAL FUNCTION PANEL: CPT

## 2019-01-01 PROCEDURE — 80048 BASIC METABOLIC PNL TOTAL CA: CPT

## 2019-01-01 PROCEDURE — 83605 ASSAY OF LACTIC ACID: CPT

## 2019-01-01 PROCEDURE — 84132 ASSAY OF SERUM POTASSIUM: CPT

## 2019-01-01 PROCEDURE — 85027 COMPLETE CBC AUTOMATED: CPT

## 2019-01-01 PROCEDURE — 96365 THER/PROPH/DIAG IV INF INIT: CPT

## 2019-01-01 PROCEDURE — 1210000000 HC MED SURG R&B

## 2019-01-01 PROCEDURE — 94668 MNPJ CHEST WALL SBSQ: CPT

## 2019-01-01 PROCEDURE — C9113 INJ PANTOPRAZOLE SODIUM, VIA: HCPCS | Performed by: INTERNAL MEDICINE

## 2019-01-01 PROCEDURE — 89220 SPUTUM SPECIMEN COLLECTION: CPT

## 2019-01-01 PROCEDURE — 36415 COLL VENOUS BLD VENIPUNCTURE: CPT

## 2019-01-01 PROCEDURE — 87086 URINE CULTURE/COLONY COUNT: CPT

## 2019-01-01 PROCEDURE — 87804 INFLUENZA ASSAY W/OPTIC: CPT

## 2019-01-01 PROCEDURE — 87070 CULTURE OTHR SPECIMN AEROBIC: CPT

## 2019-01-01 PROCEDURE — 80053 COMPREHEN METABOLIC PANEL: CPT

## 2019-01-01 PROCEDURE — 87186 SC STD MICRODIL/AGAR DIL: CPT

## 2019-01-01 PROCEDURE — 94664 DEMO&/EVAL PT USE INHALER: CPT

## 2019-01-01 PROCEDURE — 36600 WITHDRAWAL OF ARTERIAL BLOOD: CPT

## 2019-01-01 PROCEDURE — 6360000002 HC RX W HCPCS: Performed by: EMERGENCY MEDICINE

## 2019-01-01 PROCEDURE — 43762 RPLC GTUBE NO REVJ TRC: CPT | Performed by: SURGERY

## 2019-01-01 PROCEDURE — 94761 N-INVAS EAR/PLS OXIMETRY MLT: CPT

## 2019-01-01 PROCEDURE — 0D20XUZ CHANGE FEEDING DEVICE IN UPPER INTESTINAL TRACT, EXTERNAL APPROACH: ICD-10-PCS | Performed by: SURGERY

## 2019-01-01 PROCEDURE — 82803 BLOOD GASES ANY COMBINATION: CPT

## 2019-01-01 PROCEDURE — 2580000003 HC RX 258: Performed by: PHYSICIAN ASSISTANT

## 2019-01-01 PROCEDURE — 82330 ASSAY OF CALCIUM: CPT

## 2019-01-01 PROCEDURE — 99223 1ST HOSP IP/OBS HIGH 75: CPT | Performed by: INTERNAL MEDICINE

## 2019-01-01 PROCEDURE — 87077 CULTURE AEROBIC IDENTIFY: CPT

## 2019-01-01 PROCEDURE — 99309 SBSQ NF CARE MODERATE MDM 30: CPT | Performed by: PHYSICIAN ASSISTANT

## 2019-01-01 PROCEDURE — 51702 INSERT TEMP BLADDER CATH: CPT

## 2019-01-01 PROCEDURE — 83880 ASSAY OF NATRIURETIC PEPTIDE: CPT

## 2019-01-01 PROCEDURE — 82435 ASSAY OF BLOOD CHLORIDE: CPT

## 2019-01-01 PROCEDURE — 99308 SBSQ NF CARE LOW MDM 20: CPT | Performed by: PHYSICIAN ASSISTANT

## 2019-01-01 PROCEDURE — 71045 X-RAY EXAM CHEST 1 VIEW: CPT

## 2019-01-01 PROCEDURE — 87040 BLOOD CULTURE FOR BACTERIA: CPT

## 2019-01-01 PROCEDURE — 2709999900 IR PICC WO SQ PORT/PUMP > 5 YEARS

## 2019-01-01 PROCEDURE — 2580000003 HC RX 258

## 2019-01-01 PROCEDURE — 93005 ELECTROCARDIOGRAM TRACING: CPT

## 2019-01-01 PROCEDURE — 96367 TX/PROPH/DG ADDL SEQ IV INF: CPT

## 2019-01-01 PROCEDURE — 84484 ASSAY OF TROPONIN QUANT: CPT

## 2019-01-01 PROCEDURE — 2000000000 HC ICU R&B

## 2019-01-01 PROCEDURE — 36573 INSJ PICC RS&I 5 YR+: CPT | Performed by: RADIOLOGY

## 2019-01-01 PROCEDURE — 99222 1ST HOSP IP/OBS MODERATE 55: CPT | Performed by: INTERNAL MEDICINE

## 2019-01-01 PROCEDURE — 85014 HEMATOCRIT: CPT

## 2019-01-01 PROCEDURE — 84295 ASSAY OF SERUM SODIUM: CPT

## 2019-01-01 PROCEDURE — 2580000003 HC RX 258: Performed by: EMERGENCY MEDICINE

## 2019-01-01 PROCEDURE — G8484 FLU IMMUNIZE NO ADMIN: HCPCS | Performed by: PHYSICIAN ASSISTANT

## 2019-01-01 PROCEDURE — 6370000000 HC RX 637 (ALT 250 FOR IP): Performed by: EMERGENCY MEDICINE

## 2019-01-01 PROCEDURE — 43762 RPLC GTUBE NO REVJ TRC: CPT

## 2019-01-01 PROCEDURE — 87205 SMEAR GRAM STAIN: CPT

## 2019-01-01 PROCEDURE — 93010 ELECTROCARDIOGRAM REPORT: CPT | Performed by: INTERNAL MEDICINE

## 2019-01-01 PROCEDURE — 99283 EMERGENCY DEPT VISIT LOW MDM: CPT

## 2019-01-01 PROCEDURE — 99310 SBSQ NF CARE HIGH MDM 45: CPT | Performed by: PHYSICIAN ASSISTANT

## 2019-01-01 PROCEDURE — 02HV33Z INSERTION OF INFUSION DEVICE INTO SUPERIOR VENA CAVA, PERCUTANEOUS APPROACH: ICD-10-PCS | Performed by: INTERNAL MEDICINE

## 2019-01-01 PROCEDURE — 86141 C-REACTIVE PROTEIN HS: CPT

## 2019-01-01 PROCEDURE — 99233 SBSQ HOSP IP/OBS HIGH 50: CPT | Performed by: INTERNAL MEDICINE

## 2019-01-01 PROCEDURE — 84145 PROCALCITONIN (PCT): CPT

## 2019-01-01 PROCEDURE — 80202 ASSAY OF VANCOMYCIN: CPT

## 2019-01-01 PROCEDURE — 99310 SBSQ NF CARE HIGH MDM 45: CPT | Performed by: NURSE PRACTITIONER

## 2019-01-01 PROCEDURE — 36592 COLLECT BLOOD FROM PICC: CPT

## 2019-01-01 RX ORDER — IPRATROPIUM BROMIDE AND ALBUTEROL SULFATE 2.5; .5 MG/3ML; MG/3ML
1 SOLUTION RESPIRATORY (INHALATION)
Status: DISCONTINUED | OUTPATIENT
Start: 2019-01-01 | End: 2019-01-01

## 2019-01-01 RX ORDER — PANTOPRAZOLE SODIUM 40 MG/10ML
40 INJECTION, POWDER, LYOPHILIZED, FOR SOLUTION INTRAVENOUS DAILY
Status: DISCONTINUED | OUTPATIENT
Start: 2019-01-01 | End: 2019-01-01 | Stop reason: SDUPTHER

## 2019-01-01 RX ORDER — CALCIUM CARBONATE 500(1250)
500 TABLET ORAL 2 TIMES DAILY
Status: DISCONTINUED | OUTPATIENT
Start: 2019-01-01 | End: 2019-01-01 | Stop reason: HOSPADM

## 2019-01-01 RX ORDER — GABAPENTIN 400 MG/1
400 CAPSULE ORAL 3 TIMES DAILY
Status: DISCONTINUED | OUTPATIENT
Start: 2019-01-01 | End: 2019-01-01 | Stop reason: HOSPADM

## 2019-01-01 RX ORDER — IPRATROPIUM BROMIDE AND ALBUTEROL SULFATE 2.5; .5 MG/3ML; MG/3ML
1 SOLUTION RESPIRATORY (INHALATION) ONCE
Status: COMPLETED | OUTPATIENT
Start: 2019-01-01 | End: 2019-01-01

## 2019-01-01 RX ORDER — SODIUM CHLORIDE, SODIUM LACTATE, POTASSIUM CHLORIDE, CALCIUM CHLORIDE 600; 310; 30; 20 MG/100ML; MG/100ML; MG/100ML; MG/100ML
INJECTION, SOLUTION INTRAVENOUS CONTINUOUS
Status: DISCONTINUED | OUTPATIENT
Start: 2019-01-01 | End: 2019-01-01 | Stop reason: ALTCHOICE

## 2019-01-01 RX ORDER — SODIUM CHLORIDE 9 MG/ML
INJECTION, SOLUTION INTRAVENOUS CONTINUOUS
Status: DISCONTINUED | OUTPATIENT
Start: 2019-01-01 | End: 2019-01-01 | Stop reason: HOSPADM

## 2019-01-01 RX ORDER — 0.9 % SODIUM CHLORIDE 0.9 %
500 INTRAVENOUS SOLUTION INTRAVENOUS ONCE
Status: COMPLETED | OUTPATIENT
Start: 2019-01-01 | End: 2019-01-01

## 2019-01-01 RX ORDER — ATORVASTATIN CALCIUM 10 MG/1
10 TABLET, FILM COATED ORAL DAILY
Status: DISCONTINUED | OUTPATIENT
Start: 2019-01-01 | End: 2019-01-01 | Stop reason: HOSPADM

## 2019-01-01 RX ORDER — MAGNESIUM HYDROXIDE 1200 MG/15ML
LIQUID ORAL
Status: COMPLETED
Start: 2019-01-01 | End: 2019-01-01

## 2019-01-01 RX ORDER — SODIUM CHLORIDE 0.9 % (FLUSH) 0.9 %
10 SYRINGE (ML) INJECTION PRN
Status: DISCONTINUED | OUTPATIENT
Start: 2019-01-01 | End: 2019-01-01 | Stop reason: HOSPADM

## 2019-01-01 RX ORDER — PANTOPRAZOLE SODIUM 40 MG/1
40 TABLET, DELAYED RELEASE ORAL
Status: DISCONTINUED | OUTPATIENT
Start: 2019-01-01 | End: 2019-01-01

## 2019-01-01 RX ORDER — BISACODYL 10 MG
10 SUPPOSITORY, RECTAL RECTAL DAILY PRN
COMMUNITY

## 2019-01-01 RX ORDER — ACETAMINOPHEN 500 MG
1000 TABLET ORAL ONCE
Status: DISCONTINUED | OUTPATIENT
Start: 2019-01-01 | End: 2019-01-01

## 2019-01-01 RX ORDER — CARBAMAZEPINE 200 MG/1
400 TABLET ORAL
Status: DISCONTINUED | OUTPATIENT
Start: 2019-01-01 | End: 2019-01-01

## 2019-01-01 RX ORDER — ALBUTEROL SULFATE 2.5 MG/3ML
2.5 SOLUTION RESPIRATORY (INHALATION) 3 TIMES DAILY
Status: DISCONTINUED | OUTPATIENT
Start: 2019-01-01 | End: 2019-01-01 | Stop reason: HOSPADM

## 2019-01-01 RX ORDER — GABAPENTIN 400 MG/1
200 CAPSULE ORAL 3 TIMES DAILY
COMMUNITY
End: 2019-01-01 | Stop reason: SDUPTHER

## 2019-01-01 RX ORDER — AZITHROMYCIN 250 MG/1
500 TABLET, FILM COATED ORAL ONCE
Status: DISCONTINUED | OUTPATIENT
Start: 2019-01-01 | End: 2019-01-01

## 2019-01-01 RX ORDER — CARBAMAZEPINE 100 MG/5ML
400 SUSPENSION ORAL DAILY
Status: DISCONTINUED | OUTPATIENT
Start: 2019-01-01 | End: 2019-01-01 | Stop reason: HOSPADM

## 2019-01-01 RX ORDER — ACETAMINOPHEN 325 MG/1
650 TABLET ORAL EVERY 4 HOURS PRN
Status: DISCONTINUED | OUTPATIENT
Start: 2019-01-01 | End: 2019-01-01 | Stop reason: HOSPADM

## 2019-01-01 RX ORDER — GABAPENTIN 400 MG/1
400 CAPSULE ORAL 3 TIMES DAILY
Qty: 21 CAPSULE | Refills: 0 | Status: ON HOLD | OUTPATIENT
Start: 2019-01-01 | End: 2019-01-01 | Stop reason: HOSPADM

## 2019-01-01 RX ORDER — CARBAMAZEPINE 100 MG/5ML
200 SUSPENSION ORAL DAILY
COMMUNITY

## 2019-01-01 RX ORDER — SODIUM CHLORIDE 0.9 % (FLUSH) 0.9 %
10 SYRINGE (ML) INJECTION EVERY 12 HOURS SCHEDULED
Status: DISCONTINUED | OUTPATIENT
Start: 2019-01-01 | End: 2019-01-01 | Stop reason: HOSPADM

## 2019-01-01 RX ORDER — GABAPENTIN 400 MG/1
400 CAPSULE ORAL 3 TIMES DAILY
Qty: 90 CAPSULE | Refills: 0 | Status: SHIPPED | OUTPATIENT
Start: 2019-01-01 | End: 2019-01-01 | Stop reason: SDUPTHER

## 2019-01-01 RX ORDER — GLYCOPYRROLATE 1 MG/1
1 TABLET ORAL 2 TIMES DAILY
Status: DISCONTINUED | OUTPATIENT
Start: 2019-01-01 | End: 2019-01-01 | Stop reason: HOSPADM

## 2019-01-01 RX ORDER — CODEINE PHOSPHATE AND GUAIFENESIN 10; 100 MG/5ML; MG/5ML
5 SOLUTION ORAL EVERY 4 HOURS PRN
Status: DISCONTINUED | OUTPATIENT
Start: 2019-01-01 | End: 2019-01-01 | Stop reason: HOSPADM

## 2019-01-01 RX ORDER — FAMOTIDINE 20 MG/1
20 TABLET, FILM COATED ORAL 2 TIMES DAILY
Status: DISCONTINUED | OUTPATIENT
Start: 2019-01-01 | End: 2019-01-01 | Stop reason: HOSPADM

## 2019-01-01 RX ORDER — LINEZOLID 2 MG/ML
600 INJECTION, SOLUTION INTRAVENOUS EVERY 12 HOURS
Status: DISCONTINUED | OUTPATIENT
Start: 2019-01-01 | End: 2019-01-01 | Stop reason: HOSPADM

## 2019-01-01 RX ORDER — ALBUTEROL SULFATE 2.5 MG/3ML
2.5 SOLUTION RESPIRATORY (INHALATION)
Status: DISCONTINUED | OUTPATIENT
Start: 2019-01-01 | End: 2019-01-01

## 2019-01-01 RX ORDER — SODIUM CHLORIDE 9 MG/ML
250 INJECTION, SOLUTION INTRAVENOUS ONCE
Status: COMPLETED | OUTPATIENT
Start: 2019-01-01 | End: 2019-01-01

## 2019-01-01 RX ORDER — BISACODYL 10 MG
10 SUPPOSITORY, RECTAL RECTAL DAILY PRN
Status: DISCONTINUED | OUTPATIENT
Start: 2019-01-01 | End: 2019-01-01 | Stop reason: HOSPADM

## 2019-01-01 RX ORDER — ACETAMINOPHEN 650 MG/1
650 SUPPOSITORY RECTAL ONCE
Status: COMPLETED | OUTPATIENT
Start: 2019-01-01 | End: 2019-01-01

## 2019-01-01 RX ORDER — SENNA AND DOCUSATE SODIUM 50; 8.6 MG/1; MG/1
2 TABLET, FILM COATED ORAL DAILY
Status: DISCONTINUED | OUTPATIENT
Start: 2019-01-01 | End: 2019-01-01 | Stop reason: HOSPADM

## 2019-01-01 RX ORDER — CARBAMAZEPINE 100 MG/5ML
200 SUSPENSION ORAL DAILY
Status: DISCONTINUED | OUTPATIENT
Start: 2019-01-01 | End: 2019-01-01

## 2019-01-01 RX ORDER — GLYCOPYRROLATE 1 MG/1
1 TABLET ORAL 2 TIMES DAILY
COMMUNITY

## 2019-01-01 RX ORDER — LIDOCAINE HYDROCHLORIDE 20 MG/ML
5 INJECTION, SOLUTION INFILTRATION; PERINEURAL ONCE
Status: COMPLETED | OUTPATIENT
Start: 2019-01-01 | End: 2019-01-01

## 2019-01-01 RX ORDER — GABAPENTIN 400 MG/1
200 CAPSULE ORAL 3 TIMES DAILY
Qty: 90 CAPSULE | Refills: 1 | Status: SHIPPED | OUTPATIENT
Start: 2019-01-01 | End: 2019-01-01 | Stop reason: SDUPTHER

## 2019-01-01 RX ORDER — 0.9 % SODIUM CHLORIDE 0.9 %
1000 INTRAVENOUS SOLUTION INTRAVENOUS ONCE
Status: COMPLETED | OUTPATIENT
Start: 2019-01-01 | End: 2019-01-01

## 2019-01-01 RX ORDER — SODIUM CHLORIDE 9 MG/ML
INJECTION, SOLUTION INTRAVENOUS CONTINUOUS
Status: DISCONTINUED | OUTPATIENT
Start: 2019-01-01 | End: 2019-01-01

## 2019-01-01 RX ORDER — ATORVASTATIN CALCIUM 10 MG/1
10 TABLET, FILM COATED ORAL DAILY
COMMUNITY

## 2019-01-01 RX ORDER — PREDNISONE 10 MG/1
10 TABLET ORAL DAILY
Status: DISCONTINUED | OUTPATIENT
Start: 2019-01-01 | End: 2019-01-01 | Stop reason: HOSPADM

## 2019-01-01 RX ORDER — POTASSIUM CHLORIDE 1.5 G/1.77G
40 POWDER, FOR SOLUTION ORAL ONCE
Status: COMPLETED | OUTPATIENT
Start: 2019-01-01 | End: 2019-01-01

## 2019-01-01 RX ORDER — SODIUM CHLORIDE 9 MG/ML
INJECTION, SOLUTION INTRAVENOUS CONTINUOUS
Status: DISPENSED | OUTPATIENT
Start: 2019-01-01 | End: 2019-01-01

## 2019-01-01 RX ORDER — GABAPENTIN 400 MG/1
CAPSULE ORAL
Qty: 90 CAPSULE | Refills: 5 | Status: SHIPPED | OUTPATIENT
Start: 2019-01-01 | End: 2019-07-15

## 2019-01-01 RX ORDER — PANTOPRAZOLE SODIUM 40 MG/1
40 GRANULE, DELAYED RELEASE ORAL
Qty: 30 EACH | Refills: 3 | DISCHARGE
Start: 2019-01-01

## 2019-01-01 RX ORDER — CARBAMAZEPINE 100 MG/5ML
400 SUSPENSION ORAL
Status: DISCONTINUED | OUTPATIENT
Start: 2019-01-01 | End: 2019-01-01 | Stop reason: HOSPADM

## 2019-01-01 RX ORDER — FAMOTIDINE 20 MG/1
20 TABLET, FILM COATED ORAL 2 TIMES DAILY
COMMUNITY

## 2019-01-01 RX ORDER — ALBUTEROL SULFATE 2.5 MG/3ML
2.5 SOLUTION RESPIRATORY (INHALATION) 3 TIMES DAILY
Status: DISCONTINUED | OUTPATIENT
Start: 2019-01-01 | End: 2019-01-01

## 2019-01-01 RX ORDER — ACETAMINOPHEN 650 MG/1
650 SUPPOSITORY RECTAL EVERY 4 HOURS PRN
Status: DISCONTINUED | OUTPATIENT
Start: 2019-01-01 | End: 2019-01-01 | Stop reason: HOSPADM

## 2019-01-01 RX ORDER — CARBAMAZEPINE 100 MG/5ML
400 SUSPENSION ORAL DAILY
COMMUNITY

## 2019-01-01 RX ORDER — ALBUTEROL SULFATE 2.5 MG/3ML
2.5 SOLUTION RESPIRATORY (INHALATION) 3 TIMES DAILY
Qty: 120 EACH | Refills: 3 | DISCHARGE
Start: 2019-01-01

## 2019-01-01 RX ORDER — ALBUTEROL SULFATE 2.5 MG/3ML
2.5 SOLUTION RESPIRATORY (INHALATION)
Status: DISCONTINUED | OUTPATIENT
Start: 2019-01-01 | End: 2019-01-01 | Stop reason: HOSPADM

## 2019-01-01 RX ORDER — CARBAMAZEPINE 100 MG/5ML
200 SUSPENSION ORAL NIGHTLY
Status: DISCONTINUED | OUTPATIENT
Start: 2019-01-01 | End: 2019-01-01 | Stop reason: HOSPADM

## 2019-01-01 RX ORDER — POLYETHYLENE GLYCOL 3350 17 G/17G
17 POWDER, FOR SOLUTION ORAL DAILY
Status: DISCONTINUED | OUTPATIENT
Start: 2019-01-01 | End: 2019-01-01 | Stop reason: HOSPADM

## 2019-01-01 RX ORDER — MULTIVIT-MIN/FERROUS GLUCONATE 9 MG/15 ML
15 LIQUID (ML) ORAL DAILY
Status: DISCONTINUED | OUTPATIENT
Start: 2019-01-01 | End: 2019-01-01 | Stop reason: HOSPADM

## 2019-01-01 RX ORDER — PREDNISONE 10 MG/1
10 TABLET ORAL DAILY
COMMUNITY

## 2019-01-01 RX ORDER — GABAPENTIN 400 MG/1
CAPSULE ORAL
Qty: 90 CAPSULE | Refills: 5 | Status: SHIPPED | OUTPATIENT
Start: 2019-01-01 | End: 2019-01-01 | Stop reason: SDUPTHER

## 2019-01-01 RX ORDER — CARBAMAZEPINE 200 MG/1
200 TABLET ORAL ONCE
Status: COMPLETED | OUTPATIENT
Start: 2019-01-01 | End: 2019-01-01

## 2019-01-01 RX ORDER — M-VIT,TX,IRON,MINS/CALC/FOLIC 27MG-0.4MG
1 TABLET ORAL DAILY
COMMUNITY

## 2019-01-01 RX ORDER — ALBUTEROL SULFATE 2.5 MG/3ML
2.5 SOLUTION RESPIRATORY (INHALATION) 4 TIMES DAILY
Status: DISCONTINUED | OUTPATIENT
Start: 2019-01-01 | End: 2019-01-01

## 2019-01-01 RX ORDER — ACETAMINOPHEN 325 MG/1
650 TABLET ORAL EVERY 4 HOURS PRN
COMMUNITY

## 2019-01-01 RX ORDER — 0.9 % SODIUM CHLORIDE 0.9 %
10 VIAL (ML) INJECTION DAILY
Status: DISCONTINUED | OUTPATIENT
Start: 2019-01-01 | End: 2019-01-01 | Stop reason: SDUPTHER

## 2019-01-01 RX ORDER — SENNA AND DOCUSATE SODIUM 50; 8.6 MG/1; MG/1
2 TABLET, FILM COATED ORAL DAILY
COMMUNITY

## 2019-01-01 RX ORDER — ONDANSETRON 2 MG/ML
4 INJECTION INTRAMUSCULAR; INTRAVENOUS EVERY 6 HOURS PRN
Status: DISCONTINUED | OUTPATIENT
Start: 2019-01-01 | End: 2019-01-01 | Stop reason: HOSPADM

## 2019-01-01 RX ORDER — IPRATROPIUM BROMIDE AND ALBUTEROL SULFATE 2.5; .5 MG/3ML; MG/3ML
1 SOLUTION RESPIRATORY (INHALATION) 3 TIMES DAILY
Status: DISCONTINUED | OUTPATIENT
Start: 2019-01-01 | End: 2019-01-01 | Stop reason: HOSPADM

## 2019-01-01 RX ORDER — LEVOFLOXACIN 5 MG/ML
750 INJECTION, SOLUTION INTRAVENOUS EVERY 24 HOURS
Status: DISCONTINUED | OUTPATIENT
Start: 2019-01-01 | End: 2019-01-01

## 2019-01-01 RX ORDER — PANTOPRAZOLE SODIUM 40 MG/1
40 GRANULE, DELAYED RELEASE ORAL
Status: DISCONTINUED | OUTPATIENT
Start: 2019-01-01 | End: 2019-01-01 | Stop reason: HOSPADM

## 2019-01-01 RX ORDER — SODIUM CHLORIDE, SODIUM LACTATE, POTASSIUM CHLORIDE, CALCIUM CHLORIDE 600; 310; 30; 20 MG/100ML; MG/100ML; MG/100ML; MG/100ML
INJECTION, SOLUTION INTRAVENOUS CONTINUOUS
Status: DISPENSED | OUTPATIENT
Start: 2019-01-01 | End: 2019-01-01

## 2019-01-01 RX ORDER — POLYETHYLENE GLYCOL 3350 17 G/17G
17 POWDER, FOR SOLUTION ORAL DAILY
COMMUNITY

## 2019-01-01 RX ADMIN — ALBUTEROL SULFATE 2.5 MG: 2.5 SOLUTION RESPIRATORY (INHALATION) at 04:36

## 2019-01-01 RX ADMIN — ALBUTEROL SULFATE 2.5 MG: 2.5 SOLUTION RESPIRATORY (INHALATION) at 07:14

## 2019-01-01 RX ADMIN — IPRATROPIUM BROMIDE AND ALBUTEROL SULFATE 1 AMPULE: 2.5; .5 SOLUTION RESPIRATORY (INHALATION) at 07:14

## 2019-01-01 RX ADMIN — FAMOTIDINE 20 MG: 20 TABLET ORAL at 09:37

## 2019-01-01 RX ADMIN — PREDNISONE 10 MG: 10 TABLET ORAL at 08:44

## 2019-01-01 RX ADMIN — CARBAMAZEPINE 200 MG: 100 SUSPENSION ORAL at 07:36

## 2019-01-01 RX ADMIN — SENNOSIDES AND DOCUSATE SODIUM 2 TABLET: 8.6; 5 TABLET ORAL at 11:44

## 2019-01-01 RX ADMIN — PREDNISONE 10 MG: 10 TABLET ORAL at 11:45

## 2019-01-01 RX ADMIN — CALCIUM 500 MG: 500 TABLET ORAL at 22:48

## 2019-01-01 RX ADMIN — CARBAMAZEPINE 400 MG: 100 SUSPENSION ORAL at 08:44

## 2019-01-01 RX ADMIN — GABAPENTIN 400 MG: 400 CAPSULE ORAL at 13:34

## 2019-01-01 RX ADMIN — POLYETHYLENE GLYCOL 3350 17 G: 17 POWDER, FOR SOLUTION ORAL at 11:46

## 2019-01-01 RX ADMIN — ALBUTEROL SULFATE 2.5 MG: 2.5 SOLUTION RESPIRATORY (INHALATION) at 07:26

## 2019-01-01 RX ADMIN — GUAIFENESIN AND CODEINE PHOSPHATE 5 ML: 10; 100 LIQUID ORAL at 19:54

## 2019-01-01 RX ADMIN — IPRATROPIUM BROMIDE AND ALBUTEROL SULFATE 1 AMPULE: 2.5; .5 SOLUTION RESPIRATORY (INHALATION) at 07:18

## 2019-01-01 RX ADMIN — LINEZOLID 600 MG: 600 INJECTION, SOLUTION INTRAVENOUS at 11:57

## 2019-01-01 RX ADMIN — CEFEPIME 2 G: 2 INJECTION, POWDER, FOR SOLUTION INTRAVENOUS at 21:26

## 2019-01-01 RX ADMIN — IPRATROPIUM BROMIDE AND ALBUTEROL SULFATE 1 AMPULE: 2.5; .5 SOLUTION RESPIRATORY (INHALATION) at 14:04

## 2019-01-01 RX ADMIN — PREDNISONE 10 MG: 10 TABLET ORAL at 09:01

## 2019-01-01 RX ADMIN — CALCIUM 500 MG: 500 TABLET ORAL at 20:26

## 2019-01-01 RX ADMIN — CEFEPIME 2 G: 2 INJECTION, POWDER, FOR SOLUTION INTRAVENOUS at 10:49

## 2019-01-01 RX ADMIN — CALCIUM 500 MG: 500 TABLET ORAL at 09:37

## 2019-01-01 RX ADMIN — VANCOMYCIN HYDROCHLORIDE 1250 MG: 5 INJECTION, POWDER, LYOPHILIZED, FOR SOLUTION INTRAVENOUS at 11:59

## 2019-01-01 RX ADMIN — SODIUM CHLORIDE: 9 INJECTION, SOLUTION INTRAVENOUS at 14:55

## 2019-01-01 RX ADMIN — Medication 5000 UNITS: at 11:24

## 2019-01-01 RX ADMIN — WATER 1000 ML: 100 IRRIGANT IRRIGATION at 20:57

## 2019-01-01 RX ADMIN — SENNOSIDES AND DOCUSATE SODIUM 2 TABLET: 8.6; 5 TABLET ORAL at 11:24

## 2019-01-01 RX ADMIN — ATORVASTATIN CALCIUM 10 MG: 10 TABLET, FILM COATED ORAL at 08:44

## 2019-01-01 RX ADMIN — CARBAMAZEPINE 400 MG: 200 TABLET ORAL at 08:09

## 2019-01-01 RX ADMIN — NOREPINEPHRINE BITARTRATE 2 MCG/MIN: 1 INJECTION, SOLUTION, CONCENTRATE INTRAVENOUS at 16:35

## 2019-01-01 RX ADMIN — Medication 10 ML: at 09:00

## 2019-01-01 RX ADMIN — CARBAMAZEPINE 200 MG: 100 SUSPENSION ORAL at 21:07

## 2019-01-01 RX ADMIN — SODIUM CHLORIDE 1000 ML: 9 INJECTION, SOLUTION INTRAVENOUS at 13:19

## 2019-01-01 RX ADMIN — CEFEPIME 2 G: 2 INJECTION, POWDER, FOR SOLUTION INTRAVENOUS at 10:17

## 2019-01-01 RX ADMIN — ALBUTEROL SULFATE 2.5 MG: 2.5 SOLUTION RESPIRATORY (INHALATION) at 19:48

## 2019-01-01 RX ADMIN — GABAPENTIN 400 MG: 400 CAPSULE ORAL at 09:37

## 2019-01-01 RX ADMIN — SODIUM CHLORIDE: 9 INJECTION, SOLUTION INTRAVENOUS at 12:07

## 2019-01-01 RX ADMIN — ALBUTEROL SULFATE 2.5 MG: 2.5 SOLUTION RESPIRATORY (INHALATION) at 11:02

## 2019-01-01 RX ADMIN — ENOXAPARIN SODIUM 40 MG: 40 INJECTION SUBCUTANEOUS at 08:04

## 2019-01-01 RX ADMIN — GABAPENTIN 400 MG: 400 CAPSULE ORAL at 22:48

## 2019-01-01 RX ADMIN — CEFEPIME 2 G: 2 INJECTION, POWDER, FOR SOLUTION INTRAVENOUS at 00:12

## 2019-01-01 RX ADMIN — GABAPENTIN 400 MG: 400 CAPSULE ORAL at 11:25

## 2019-01-01 RX ADMIN — GUAIFENESIN AND CODEINE PHOSPHATE 5 ML: 10; 100 LIQUID ORAL at 03:29

## 2019-01-01 RX ADMIN — SODIUM CHLORIDE: 9 INJECTION, SOLUTION INTRAVENOUS at 02:09

## 2019-01-01 RX ADMIN — Medication 10 ML: at 20:58

## 2019-01-01 RX ADMIN — Medication 10 ML: at 09:35

## 2019-01-01 RX ADMIN — Medication 10 ML: at 21:45

## 2019-01-01 RX ADMIN — PANTOPRAZOLE SODIUM 40 MG: 40 INJECTION, POWDER, FOR SOLUTION INTRAVENOUS at 08:10

## 2019-01-01 RX ADMIN — CALCIUM 500 MG: 500 TABLET ORAL at 00:40

## 2019-01-01 RX ADMIN — Medication 10 ML: at 08:40

## 2019-01-01 RX ADMIN — SODIUM CHLORIDE: 9 INJECTION, SOLUTION INTRAVENOUS at 06:29

## 2019-01-01 RX ADMIN — PREDNISONE 10 MG: 10 TABLET ORAL at 08:24

## 2019-01-01 RX ADMIN — IPRATROPIUM BROMIDE AND ALBUTEROL SULFATE 1 AMPULE: 2.5; .5 SOLUTION RESPIRATORY (INHALATION) at 08:01

## 2019-01-01 RX ADMIN — GABAPENTIN 400 MG: 400 CAPSULE ORAL at 11:45

## 2019-01-01 RX ADMIN — ATORVASTATIN CALCIUM 10 MG: 10 TABLET, FILM COATED ORAL at 22:12

## 2019-01-01 RX ADMIN — PANTOPRAZOLE SODIUM 40 MG: 40 GRANULE, DELAYED RELEASE ORAL at 05:02

## 2019-01-01 RX ADMIN — Medication 15 ML: at 09:01

## 2019-01-01 RX ADMIN — CARBAMAZEPINE 400 MG: 200 TABLET ORAL at 05:45

## 2019-01-01 RX ADMIN — Medication 10 ML: at 09:55

## 2019-01-01 RX ADMIN — SODIUM CHLORIDE, POTASSIUM CHLORIDE, SODIUM LACTATE AND CALCIUM CHLORIDE: 600; 310; 30; 20 INJECTION, SOLUTION INTRAVENOUS at 01:30

## 2019-01-01 RX ADMIN — LINEZOLID 600 MG: 600 INJECTION, SOLUTION INTRAVENOUS at 00:45

## 2019-01-01 RX ADMIN — CEFEPIME 2 G: 2 INJECTION, POWDER, FOR SOLUTION INTRAVENOUS at 09:54

## 2019-01-01 RX ADMIN — CALCIUM 500 MG: 500 TABLET ORAL at 11:45

## 2019-01-01 RX ADMIN — METRONIDAZOLE 500 MG: 500 INJECTION, SOLUTION INTRAVENOUS at 18:48

## 2019-01-01 RX ADMIN — GUAIFENESIN AND CODEINE PHOSPHATE 5 ML: 10; 100 LIQUID ORAL at 08:10

## 2019-01-01 RX ADMIN — PANTOPRAZOLE SODIUM 40 MG: 40 GRANULE, DELAYED RELEASE ORAL at 06:03

## 2019-01-01 RX ADMIN — SODIUM CHLORIDE: 9 INJECTION, SOLUTION INTRAVENOUS at 21:05

## 2019-01-01 RX ADMIN — LINEZOLID 600 MG: 600 INJECTION, SOLUTION INTRAVENOUS at 11:25

## 2019-01-01 RX ADMIN — ATORVASTATIN CALCIUM 10 MG: 10 TABLET, FILM COATED ORAL at 09:36

## 2019-01-01 RX ADMIN — GLYCOPYRROLATE 1 MG: 1 TABLET ORAL at 21:17

## 2019-01-01 RX ADMIN — ATORVASTATIN CALCIUM 10 MG: 10 TABLET, FILM COATED ORAL at 11:45

## 2019-01-01 RX ADMIN — FAMOTIDINE 20 MG: 20 TABLET ORAL at 22:48

## 2019-01-01 RX ADMIN — Medication 10 ML: at 11:58

## 2019-01-01 RX ADMIN — ENOXAPARIN SODIUM 40 MG: 40 INJECTION SUBCUTANEOUS at 10:10

## 2019-01-01 RX ADMIN — VANCOMYCIN HYDROCHLORIDE 1250 MG: 5 INJECTION, POWDER, LYOPHILIZED, FOR SOLUTION INTRAVENOUS at 09:43

## 2019-01-01 RX ADMIN — ALBUTEROL SULFATE 2.5 MG: 2.5 SOLUTION RESPIRATORY (INHALATION) at 13:39

## 2019-01-01 RX ADMIN — PANTOPRAZOLE SODIUM 40 MG: 40 INJECTION, POWDER, FOR SOLUTION INTRAVENOUS at 16:14

## 2019-01-01 RX ADMIN — CARBAMAZEPINE 200 MG: 100 SUSPENSION ORAL at 22:12

## 2019-01-01 RX ADMIN — ATORVASTATIN CALCIUM 10 MG: 10 TABLET, FILM COATED ORAL at 20:59

## 2019-01-01 RX ADMIN — GABAPENTIN 400 MG: 400 CAPSULE ORAL at 09:01

## 2019-01-01 RX ADMIN — ALBUTEROL SULFATE 2.5 MG: 2.5 SOLUTION RESPIRATORY (INHALATION) at 16:19

## 2019-01-01 RX ADMIN — CARBAMAZEPINE 400 MG: 100 SUSPENSION ORAL at 09:00

## 2019-01-01 RX ADMIN — LINEZOLID 600 MG: 600 INJECTION, SOLUTION INTRAVENOUS at 23:34

## 2019-01-01 RX ADMIN — CEFEPIME 2 G: 2 INJECTION, POWDER, FOR SOLUTION INTRAVENOUS at 21:28

## 2019-01-01 RX ADMIN — Medication 10 ML: at 08:04

## 2019-01-01 RX ADMIN — AMIKACIN SULFATE 475 MG: 250 INJECTION, SOLUTION INTRAMUSCULAR; INTRAVENOUS at 21:44

## 2019-01-01 RX ADMIN — ATORVASTATIN CALCIUM 10 MG: 10 TABLET, FILM COATED ORAL at 23:42

## 2019-01-01 RX ADMIN — METRONIDAZOLE 500 MG: 500 INJECTION, SOLUTION INTRAVENOUS at 03:25

## 2019-01-01 RX ADMIN — CARBAMAZEPINE 200 MG: 100 SUSPENSION ORAL at 20:07

## 2019-01-01 RX ADMIN — Medication 10 ML: at 16:14

## 2019-01-01 RX ADMIN — AMIKACIN SULFATE 475 MG: 250 INJECTION, SOLUTION INTRAMUSCULAR; INTRAVENOUS at 00:34

## 2019-01-01 RX ADMIN — POLYETHYLENE GLYCOL 3350 17 G: 17 POWDER, FOR SOLUTION ORAL at 11:24

## 2019-01-01 RX ADMIN — CARBAMAZEPINE 200 MG: 100 SUSPENSION ORAL at 11:56

## 2019-01-01 RX ADMIN — VANCOMYCIN HYDROCHLORIDE 1250 MG: 5 INJECTION, POWDER, LYOPHILIZED, FOR SOLUTION INTRAVENOUS at 23:00

## 2019-01-01 RX ADMIN — Medication 10 ML: at 22:48

## 2019-01-01 RX ADMIN — CALCIUM 500 MG: 500 TABLET ORAL at 08:44

## 2019-01-01 RX ADMIN — SODIUM CHLORIDE: 9 INJECTION, SOLUTION INTRAVENOUS at 10:13

## 2019-01-01 RX ADMIN — GUAIFENESIN AND CODEINE PHOSPHATE 5 ML: 10; 100 LIQUID ORAL at 01:55

## 2019-01-01 RX ADMIN — ALBUTEROL SULFATE 2.5 MG: 2.5 SOLUTION RESPIRATORY (INHALATION) at 19:57

## 2019-01-01 RX ADMIN — POLYETHYLENE GLYCOL 3350 17 G: 17 POWDER, FOR SOLUTION ORAL at 09:36

## 2019-01-01 RX ADMIN — CARBAMAZEPINE 400 MG: 100 SUSPENSION ORAL at 09:36

## 2019-01-01 RX ADMIN — PANTOPRAZOLE SODIUM 40 MG: 40 GRANULE, DELAYED RELEASE ORAL at 12:12

## 2019-01-01 RX ADMIN — EPINEPHRINE 1 MG: 0.1 INJECTION INTRACARDIAC; INTRAVENOUS at 11:55

## 2019-01-01 RX ADMIN — IPRATROPIUM BROMIDE AND ALBUTEROL SULFATE 1 AMPULE: 2.5; .5 SOLUTION RESPIRATORY (INHALATION) at 08:49

## 2019-01-01 RX ADMIN — GUAIFENESIN AND CODEINE PHOSPHATE 5 ML: 10; 100 LIQUID ORAL at 06:03

## 2019-01-01 RX ADMIN — IPRATROPIUM BROMIDE AND ALBUTEROL SULFATE 1 AMPULE: 2.5; .5 SOLUTION RESPIRATORY (INHALATION) at 08:07

## 2019-01-01 RX ADMIN — IPRATROPIUM BROMIDE AND ALBUTEROL SULFATE 1 AMPULE: 2.5; .5 SOLUTION RESPIRATORY (INHALATION) at 11:05

## 2019-01-01 RX ADMIN — CARBAMAZEPINE 400 MG: 100 SUSPENSION ORAL at 11:45

## 2019-01-01 RX ADMIN — GABAPENTIN 400 MG: 400 CAPSULE ORAL at 21:45

## 2019-01-01 RX ADMIN — FAMOTIDINE 20 MG: 20 TABLET ORAL at 08:44

## 2019-01-01 RX ADMIN — GABAPENTIN 400 MG: 400 CAPSULE ORAL at 17:56

## 2019-01-01 RX ADMIN — CALCIUM 500 MG: 500 TABLET ORAL at 11:24

## 2019-01-01 RX ADMIN — FAMOTIDINE 20 MG: 20 TABLET ORAL at 11:24

## 2019-01-01 RX ADMIN — CEFEPIME 2 G: 2 INJECTION, POWDER, FOR SOLUTION INTRAVENOUS at 23:39

## 2019-01-01 RX ADMIN — ENOXAPARIN SODIUM 40 MG: 40 INJECTION SUBCUTANEOUS at 08:10

## 2019-01-01 RX ADMIN — POLYETHYLENE GLYCOL 3350 17 G: 17 POWDER, FOR SOLUTION ORAL at 08:44

## 2019-01-01 RX ADMIN — ALBUTEROL SULFATE 2.5 MG: 2.5 SOLUTION RESPIRATORY (INHALATION) at 05:15

## 2019-01-01 RX ADMIN — IPRATROPIUM BROMIDE AND ALBUTEROL SULFATE 1 AMPULE: .5; 3 SOLUTION RESPIRATORY (INHALATION) at 10:59

## 2019-01-01 RX ADMIN — FAMOTIDINE 20 MG: 20 TABLET ORAL at 00:40

## 2019-01-01 RX ADMIN — Medication 10 ML: at 11:02

## 2019-01-01 RX ADMIN — Medication 15 ML: at 09:36

## 2019-01-01 RX ADMIN — ENOXAPARIN SODIUM 40 MG: 40 INJECTION SUBCUTANEOUS at 11:25

## 2019-01-01 RX ADMIN — Medication 15 ML: at 11:46

## 2019-01-01 RX ADMIN — Medication 10 ML: at 08:05

## 2019-01-01 RX ADMIN — AMIKACIN SULFATE 475 MG: 250 INJECTION, SOLUTION INTRAMUSCULAR; INTRAVENOUS at 00:38

## 2019-01-01 RX ADMIN — GLYCOPYRROLATE 1 MG: 1 TABLET ORAL at 09:36

## 2019-01-01 RX ADMIN — Medication 10 ML: at 11:44

## 2019-01-01 RX ADMIN — CEFEPIME 2 G: 2 INJECTION, POWDER, FOR SOLUTION INTRAVENOUS at 08:04

## 2019-01-01 RX ADMIN — SODIUM CHLORIDE, POTASSIUM CHLORIDE, SODIUM LACTATE AND CALCIUM CHLORIDE: 600; 310; 30; 20 INJECTION, SOLUTION INTRAVENOUS at 16:44

## 2019-01-01 RX ADMIN — CARBAMAZEPINE 400 MG: 200 TABLET ORAL at 05:46

## 2019-01-01 RX ADMIN — AMIKACIN SULFATE 350 MG: 250 INJECTION, SOLUTION INTRAMUSCULAR; INTRAVENOUS at 19:34

## 2019-01-01 RX ADMIN — LINEZOLID 600 MG: 600 INJECTION, SOLUTION INTRAVENOUS at 13:13

## 2019-01-01 RX ADMIN — GUAIFENESIN AND CODEINE PHOSPHATE 5 ML: 10; 100 LIQUID ORAL at 20:58

## 2019-01-01 RX ADMIN — CARBAMAZEPINE 400 MG: 100 SUSPENSION ORAL at 11:25

## 2019-01-01 RX ADMIN — NOREPINEPHRINE BITARTRATE 7 MCG/MIN: 1 INJECTION, SOLUTION, CONCENTRATE INTRAVENOUS at 03:25

## 2019-01-01 RX ADMIN — PREDNISONE 10 MG: 10 TABLET ORAL at 09:54

## 2019-01-01 RX ADMIN — ALBUTEROL SULFATE 2.5 MG: 2.5 SOLUTION RESPIRATORY (INHALATION) at 11:29

## 2019-01-01 RX ADMIN — SODIUM CHLORIDE 500 ML: 9 INJECTION, SOLUTION INTRAVENOUS at 11:35

## 2019-01-01 RX ADMIN — LINEZOLID 600 MG: 600 INJECTION, SOLUTION INTRAVENOUS at 23:59

## 2019-01-01 RX ADMIN — ALBUTEROL SULFATE 2.5 MG: 2.5 SOLUTION RESPIRATORY (INHALATION) at 10:45

## 2019-01-01 RX ADMIN — METRONIDAZOLE 500 MG: 500 INJECTION, SOLUTION INTRAVENOUS at 10:32

## 2019-01-01 RX ADMIN — SODIUM CHLORIDE 250 ML: 9 INJECTION, SOLUTION INTRAVENOUS at 08:57

## 2019-01-01 RX ADMIN — Medication 10 ML: at 11:03

## 2019-01-01 RX ADMIN — ATORVASTATIN CALCIUM 10 MG: 10 TABLET, FILM COATED ORAL at 20:07

## 2019-01-01 RX ADMIN — Medication 5000 UNITS: at 09:36

## 2019-01-01 RX ADMIN — VANCOMYCIN HYDROCHLORIDE 1250 MG: 5 INJECTION, POWDER, LYOPHILIZED, FOR SOLUTION INTRAVENOUS at 00:08

## 2019-01-01 RX ADMIN — Medication 5000 UNITS: at 09:02

## 2019-01-01 RX ADMIN — GLYCOPYRROLATE 1 MG: 1 TABLET ORAL at 21:45

## 2019-01-01 RX ADMIN — GLYCOPYRROLATE 1 MG: 1 TABLET ORAL at 11:44

## 2019-01-01 RX ADMIN — ATORVASTATIN CALCIUM 10 MG: 10 TABLET, FILM COATED ORAL at 09:01

## 2019-01-01 RX ADMIN — ENOXAPARIN SODIUM 40 MG: 40 INJECTION SUBCUTANEOUS at 08:40

## 2019-01-01 RX ADMIN — ATORVASTATIN CALCIUM 10 MG: 10 TABLET, FILM COATED ORAL at 21:05

## 2019-01-01 RX ADMIN — GLYCOPYRROLATE 1 MG: 1 TABLET ORAL at 22:48

## 2019-01-01 RX ADMIN — CARBAMAZEPINE 400 MG: 100 SUSPENSION ORAL at 10:20

## 2019-01-01 RX ADMIN — Medication 10 ML: at 08:11

## 2019-01-01 RX ADMIN — PREDNISONE 10 MG: 10 TABLET ORAL at 08:05

## 2019-01-01 RX ADMIN — Medication 10 ML: at 21:17

## 2019-01-01 RX ADMIN — LIDOCAINE HYDROCHLORIDE 10 ML: 20 INJECTION, SOLUTION INFILTRATION; PERINEURAL at 08:57

## 2019-01-01 RX ADMIN — FAMOTIDINE 20 MG: 20 TABLET ORAL at 21:45

## 2019-01-01 RX ADMIN — GABAPENTIN 400 MG: 400 CAPSULE ORAL at 00:39

## 2019-01-01 RX ADMIN — CEFEPIME 2 G: 2 INJECTION, POWDER, FOR SOLUTION INTRAVENOUS at 10:39

## 2019-01-01 RX ADMIN — AMIKACIN SULFATE 475 MG: 250 INJECTION, SOLUTION INTRAMUSCULAR; INTRAVENOUS at 16:15

## 2019-01-01 RX ADMIN — CALCIUM 500 MG: 500 TABLET ORAL at 21:45

## 2019-01-01 RX ADMIN — Medication 5000 UNITS: at 08:46

## 2019-01-01 RX ADMIN — Medication 10 ML: at 08:25

## 2019-01-01 RX ADMIN — ATORVASTATIN CALCIUM 10 MG: 10 TABLET, FILM COATED ORAL at 11:24

## 2019-01-01 RX ADMIN — IPRATROPIUM BROMIDE AND ALBUTEROL SULFATE 1 AMPULE: 2.5; .5 SOLUTION RESPIRATORY (INHALATION) at 17:13

## 2019-01-01 RX ADMIN — ALBUTEROL SULFATE 2.5 MG: 2.5 SOLUTION RESPIRATORY (INHALATION) at 20:08

## 2019-01-01 RX ADMIN — CARBAMAZEPINE 200 MG: 100 SUSPENSION ORAL at 20:56

## 2019-01-01 RX ADMIN — GLYCOPYRROLATE 1 MG: 1 TABLET ORAL at 20:26

## 2019-01-01 RX ADMIN — AMIKACIN SULFATE 250 MG: 250 INJECTION, SOLUTION INTRAMUSCULAR; INTRAVENOUS at 04:15

## 2019-01-01 RX ADMIN — PANTOPRAZOLE SODIUM 40 MG: 40 INJECTION, POWDER, FOR SOLUTION INTRAVENOUS at 08:25

## 2019-01-01 RX ADMIN — AMIKACIN SULFATE 475 MG: 250 INJECTION, SOLUTION INTRAMUSCULAR; INTRAVENOUS at 20:33

## 2019-01-01 RX ADMIN — ENOXAPARIN SODIUM 40 MG: 40 INJECTION SUBCUTANEOUS at 11:57

## 2019-01-01 RX ADMIN — ACETAMINOPHEN 650 MG: 650 SUPPOSITORY RECTAL at 11:07

## 2019-01-01 RX ADMIN — IPRATROPIUM BROMIDE AND ALBUTEROL SULFATE 1 AMPULE: 2.5; .5 SOLUTION RESPIRATORY (INHALATION) at 20:25

## 2019-01-01 RX ADMIN — NOREPINEPHRINE BITARTRATE 8 MCG/MIN: 1 INJECTION, SOLUTION, CONCENTRATE INTRAVENOUS at 21:13

## 2019-01-01 RX ADMIN — SODIUM CHLORIDE 500 ML: 9 INJECTION, SOLUTION INTRAVENOUS at 09:35

## 2019-01-01 RX ADMIN — SODIUM CHLORIDE 100 ML/HR: 9 INJECTION, SOLUTION INTRAVENOUS at 20:17

## 2019-01-01 RX ADMIN — CARBAMAZEPINE 200 MG: 100 SUSPENSION ORAL at 23:00

## 2019-01-01 RX ADMIN — ALBUTEROL SULFATE 2.5 MG: 2.5 SOLUTION RESPIRATORY (INHALATION) at 04:30

## 2019-01-01 RX ADMIN — ALBUTEROL SULFATE 2.5 MG: 2.5 SOLUTION RESPIRATORY (INHALATION) at 08:15

## 2019-01-01 RX ADMIN — GLYCOPYRROLATE 1 MG: 1 TABLET ORAL at 00:40

## 2019-01-01 RX ADMIN — SODIUM CHLORIDE: 9 INJECTION, SOLUTION INTRAVENOUS at 20:14

## 2019-01-01 RX ADMIN — PREDNISONE 10 MG: 10 TABLET ORAL at 08:09

## 2019-01-01 RX ADMIN — CARBAMAZEPINE 400 MG: 200 TABLET ORAL at 07:34

## 2019-01-01 RX ADMIN — SENNOSIDES AND DOCUSATE SODIUM 2 TABLET: 8.6; 5 TABLET ORAL at 08:44

## 2019-01-01 RX ADMIN — FAMOTIDINE 20 MG: 20 TABLET ORAL at 21:17

## 2019-01-01 RX ADMIN — PREDNISONE 10 MG: 10 TABLET ORAL at 08:40

## 2019-01-01 RX ADMIN — SODIUM CHLORIDE: 9 INJECTION, SOLUTION INTRAVENOUS at 03:19

## 2019-01-01 RX ADMIN — IPRATROPIUM BROMIDE AND ALBUTEROL SULFATE 1 AMPULE: 2.5; .5 SOLUTION RESPIRATORY (INHALATION) at 20:39

## 2019-01-01 RX ADMIN — FAMOTIDINE 20 MG: 20 TABLET ORAL at 11:45

## 2019-01-01 RX ADMIN — GABAPENTIN 400 MG: 400 CAPSULE ORAL at 20:26

## 2019-01-01 RX ADMIN — ALBUTEROL SULFATE 2.5 MG: 2.5 SOLUTION RESPIRATORY (INHALATION) at 19:41

## 2019-01-01 RX ADMIN — ALBUTEROL SULFATE 2.5 MG: 2.5 SOLUTION RESPIRATORY (INHALATION) at 16:36

## 2019-01-01 RX ADMIN — ALBUTEROL SULFATE 2.5 MG: 2.5 SOLUTION RESPIRATORY (INHALATION) at 14:26

## 2019-01-01 RX ADMIN — METRONIDAZOLE 500 MG: 500 INJECTION, SOLUTION INTRAVENOUS at 02:50

## 2019-01-01 RX ADMIN — ENOXAPARIN SODIUM 40 MG: 40 INJECTION SUBCUTANEOUS at 11:46

## 2019-01-01 RX ADMIN — SODIUM CHLORIDE 1000 ML: 900 IRRIGANT IRRIGATION at 14:19

## 2019-01-01 RX ADMIN — ENOXAPARIN SODIUM 40 MG: 40 INJECTION SUBCUTANEOUS at 08:45

## 2019-01-01 RX ADMIN — IPRATROPIUM BROMIDE AND ALBUTEROL SULFATE 1 AMPULE: 2.5; .5 SOLUTION RESPIRATORY (INHALATION) at 19:49

## 2019-01-01 RX ADMIN — SODIUM CHLORIDE: 9 INJECTION, SOLUTION INTRAVENOUS at 02:01

## 2019-01-01 RX ADMIN — ACETAMINOPHEN 650 MG: 325 TABLET ORAL at 20:57

## 2019-01-01 RX ADMIN — SODIUM CHLORIDE: 9 INJECTION, SOLUTION INTRAVENOUS at 07:30

## 2019-01-01 RX ADMIN — LINEZOLID 600 MG: 600 INJECTION, SOLUTION INTRAVENOUS at 12:06

## 2019-01-01 RX ADMIN — CARBAMAZEPINE 200 MG: 100 SUSPENSION ORAL at 01:55

## 2019-01-01 RX ADMIN — CARBAMAZEPINE 200 MG: 200 TABLET ORAL at 11:48

## 2019-01-01 RX ADMIN — SODIUM CHLORIDE: 9 INJECTION, SOLUTION INTRAVENOUS at 18:15

## 2019-01-01 RX ADMIN — PANTOPRAZOLE SODIUM 40 MG: 40 INJECTION, POWDER, FOR SOLUTION INTRAVENOUS at 08:40

## 2019-01-01 RX ADMIN — PREDNISONE 10 MG: 10 TABLET ORAL at 11:26

## 2019-01-01 RX ADMIN — PREDNISONE 10 MG: 10 TABLET ORAL at 11:03

## 2019-01-01 RX ADMIN — SODIUM CHLORIDE 1000 ML: 9 INJECTION, SOLUTION INTRAVENOUS at 19:34

## 2019-01-01 RX ADMIN — IPRATROPIUM BROMIDE AND ALBUTEROL SULFATE 1 AMPULE: 2.5; .5 SOLUTION RESPIRATORY (INHALATION) at 12:32

## 2019-01-01 RX ADMIN — ALBUTEROL SULFATE 2.5 MG: 2.5 SOLUTION RESPIRATORY (INHALATION) at 08:13

## 2019-01-01 RX ADMIN — PANTOPRAZOLE SODIUM 40 MG: 40 INJECTION, POWDER, FOR SOLUTION INTRAVENOUS at 08:04

## 2019-01-01 RX ADMIN — ENOXAPARIN SODIUM 40 MG: 40 INJECTION SUBCUTANEOUS at 23:42

## 2019-01-01 RX ADMIN — IPRATROPIUM BROMIDE AND ALBUTEROL SULFATE 1 AMPULE: 2.5; .5 SOLUTION RESPIRATORY (INHALATION) at 19:58

## 2019-01-01 RX ADMIN — SENNOSIDES AND DOCUSATE SODIUM 2 TABLET: 8.6; 5 TABLET ORAL at 09:36

## 2019-01-01 RX ADMIN — Medication 10 ML: at 20:56

## 2019-01-01 RX ADMIN — LINEZOLID 600 MG: 600 INJECTION, SOLUTION INTRAVENOUS at 11:29

## 2019-01-01 RX ADMIN — Medication 10 ML: at 23:43

## 2019-01-01 RX ADMIN — ALBUTEROL SULFATE 2.5 MG: 2.5 SOLUTION RESPIRATORY (INHALATION) at 19:29

## 2019-01-01 RX ADMIN — Medication 10 ML: at 08:45

## 2019-01-01 RX ADMIN — LINEZOLID 600 MG: 600 INJECTION, SOLUTION INTRAVENOUS at 23:56

## 2019-01-01 RX ADMIN — IPRATROPIUM BROMIDE AND ALBUTEROL SULFATE 1 AMPULE: .5; 3 SOLUTION RESPIRATORY (INHALATION) at 23:13

## 2019-01-01 RX ADMIN — PREDNISONE 10 MG: 10 TABLET ORAL at 09:36

## 2019-01-01 RX ADMIN — SODIUM CHLORIDE: 9 INJECTION, SOLUTION INTRAVENOUS at 02:23

## 2019-01-01 RX ADMIN — CEFEPIME 2 G: 2 INJECTION, POWDER, FOR SOLUTION INTRAVENOUS at 22:12

## 2019-01-01 RX ADMIN — POTASSIUM CHLORIDE 40 MEQ: 1.5 POWDER, FOR SOLUTION ORAL at 08:04

## 2019-01-01 RX ADMIN — SODIUM CHLORIDE: 9 INJECTION, SOLUTION INTRAVENOUS at 10:03

## 2019-01-01 RX ADMIN — Medication 15 ML: at 08:44

## 2019-01-01 RX ADMIN — CEFEPIME 2 G: 2 INJECTION, POWDER, FOR SOLUTION INTRAVENOUS at 11:53

## 2019-01-01 RX ADMIN — FAMOTIDINE 20 MG: 20 TABLET ORAL at 09:01

## 2019-01-01 RX ADMIN — ENOXAPARIN SODIUM 40 MG: 40 INJECTION SUBCUTANEOUS at 09:35

## 2019-01-01 RX ADMIN — GLYCOPYRROLATE 1 MG: 1 TABLET ORAL at 11:24

## 2019-01-01 RX ADMIN — SODIUM CHLORIDE: 9 INJECTION, SOLUTION INTRAVENOUS at 15:25

## 2019-01-01 RX ADMIN — CEFEPIME 2 G: 2 INJECTION, POWDER, FOR SOLUTION INTRAVENOUS at 10:03

## 2019-01-01 RX ADMIN — ACETAMINOPHEN 650 MG: 325 TABLET ORAL at 07:35

## 2019-01-01 RX ADMIN — ALBUTEROL SULFATE 2.5 MG: 2.5 SOLUTION RESPIRATORY (INHALATION) at 17:40

## 2019-01-01 RX ADMIN — GABAPENTIN 400 MG: 400 CAPSULE ORAL at 21:17

## 2019-01-01 RX ADMIN — ALBUTEROL SULFATE 2.5 MG: 2.5 SOLUTION RESPIRATORY (INHALATION) at 20:38

## 2019-01-01 RX ADMIN — ALBUTEROL SULFATE 2.5 MG: 2.5 SOLUTION RESPIRATORY (INHALATION) at 10:16

## 2019-01-01 RX ADMIN — GABAPENTIN 400 MG: 400 CAPSULE ORAL at 15:14

## 2019-01-01 RX ADMIN — POLYETHYLENE GLYCOL 3350 17 G: 17 POWDER, FOR SOLUTION ORAL at 09:01

## 2019-01-01 RX ADMIN — AMIKACIN SULFATE 475 MG: 250 INJECTION, SOLUTION INTRAMUSCULAR; INTRAVENOUS at 17:57

## 2019-01-01 RX ADMIN — IPRATROPIUM BROMIDE AND ALBUTEROL SULFATE 1 AMPULE: 2.5; .5 SOLUTION RESPIRATORY (INHALATION) at 19:43

## 2019-01-01 RX ADMIN — Medication 10 ML: at 10:04

## 2019-01-01 RX ADMIN — PREDNISONE 10 MG: 10 TABLET ORAL at 11:55

## 2019-01-01 RX ADMIN — CALCIUM 500 MG: 500 TABLET ORAL at 09:01

## 2019-01-01 RX ADMIN — ACETAMINOPHEN 650 MG: 325 TABLET ORAL at 21:43

## 2019-01-01 RX ADMIN — SODIUM CHLORIDE: 9 INJECTION, SOLUTION INTRAVENOUS at 00:34

## 2019-01-01 RX ADMIN — AZITHROMYCIN MONOHYDRATE 500 MG: 500 INJECTION, POWDER, LYOPHILIZED, FOR SOLUTION INTRAVENOUS at 11:45

## 2019-01-01 RX ADMIN — METRONIDAZOLE 500 MG: 500 INJECTION, SOLUTION INTRAVENOUS at 10:03

## 2019-01-01 RX ADMIN — ALBUTEROL SULFATE 2.5 MG: 2.5 SOLUTION RESPIRATORY (INHALATION) at 15:29

## 2019-01-01 RX ADMIN — Medication 15 ML: at 11:25

## 2019-01-01 RX ADMIN — ENOXAPARIN SODIUM 40 MG: 40 INJECTION SUBCUTANEOUS at 09:01

## 2019-01-01 RX ADMIN — ALBUTEROL SULFATE 2.5 MG: 2.5 SOLUTION RESPIRATORY (INHALATION) at 03:40

## 2019-01-01 RX ADMIN — GLYCOPYRROLATE 1 MG: 1 TABLET ORAL at 12:37

## 2019-01-01 RX ADMIN — ACETAMINOPHEN 650 MG: 325 TABLET ORAL at 06:39

## 2019-01-01 RX ADMIN — ENOXAPARIN SODIUM 40 MG: 40 INJECTION SUBCUTANEOUS at 16:13

## 2019-01-01 RX ADMIN — Medication 5000 UNITS: at 11:45

## 2019-01-01 RX ADMIN — FAMOTIDINE 20 MG: 20 TABLET ORAL at 20:26

## 2019-01-01 RX ADMIN — CEFEPIME 2 G: 2 INJECTION, POWDER, FOR SOLUTION INTRAVENOUS at 22:00

## 2019-01-01 RX ADMIN — IPRATROPIUM BROMIDE AND ALBUTEROL SULFATE 1 AMPULE: 2.5; .5 SOLUTION RESPIRATORY (INHALATION) at 13:16

## 2019-01-01 RX ADMIN — ACETAMINOPHEN 650 MG: 325 TABLET ORAL at 00:32

## 2019-01-01 RX ADMIN — AMIKACIN SULFATE 250 MG: 250 INJECTION, SOLUTION INTRAMUSCULAR; INTRAVENOUS at 13:20

## 2019-01-01 RX ADMIN — GABAPENTIN 400 MG: 400 CAPSULE ORAL at 08:45

## 2019-01-01 RX ADMIN — ATORVASTATIN CALCIUM 10 MG: 10 TABLET, FILM COATED ORAL at 20:56

## 2019-01-01 RX ADMIN — IPRATROPIUM BROMIDE AND ALBUTEROL SULFATE 1 AMPULE: 2.5; .5 SOLUTION RESPIRATORY (INHALATION) at 16:09

## 2019-01-01 RX ADMIN — ALBUTEROL SULFATE 2.5 MG: 2.5 SOLUTION RESPIRATORY (INHALATION) at 13:55

## 2019-01-01 RX ADMIN — IPRATROPIUM BROMIDE AND ALBUTEROL SULFATE 1 AMPULE: 2.5; .5 SOLUTION RESPIRATORY (INHALATION) at 14:54

## 2019-01-01 RX ADMIN — CALCIUM 500 MG: 500 TABLET ORAL at 21:17

## 2019-01-01 RX ADMIN — VANCOMYCIN HYDROCHLORIDE 1250 MG: 5 INJECTION, POWDER, LYOPHILIZED, FOR SOLUTION INTRAVENOUS at 10:47

## 2019-01-01 RX ADMIN — ALBUTEROL SULFATE 2.5 MG: 2.5 SOLUTION RESPIRATORY (INHALATION) at 07:41

## 2019-01-01 RX ADMIN — SENNOSIDES AND DOCUSATE SODIUM 2 TABLET: 8.6; 5 TABLET ORAL at 09:01

## 2019-01-01 RX ADMIN — CARBAMAZEPINE 200 MG: 100 SUSPENSION ORAL at 20:57

## 2019-01-01 RX ADMIN — SODIUM CHLORIDE 500 ML: 9 INJECTION, SOLUTION INTRAVENOUS at 09:55

## 2019-01-01 RX ADMIN — CEFTRIAXONE 1 G: 1 INJECTION, POWDER, FOR SOLUTION INTRAMUSCULAR; INTRAVENOUS at 11:37

## 2019-01-01 RX ADMIN — ALBUTEROL SULFATE 2.5 MG: 2.5 SOLUTION RESPIRATORY (INHALATION) at 04:09

## 2019-01-01 RX ADMIN — SODIUM CHLORIDE 500 ML: 9 INJECTION, SOLUTION INTRAVENOUS at 13:25

## 2019-01-01 RX ADMIN — CARBAMAZEPINE 400 MG: 100 SUSPENSION ORAL at 13:29

## 2019-01-01 RX ADMIN — ACETAMINOPHEN 650 MG: 325 TABLET ORAL at 16:28

## 2019-01-01 ASSESSMENT — ENCOUNTER SYMPTOMS
WHEEZING: 0
TROUBLE SWALLOWING: 0
COLOR CHANGE: 0
COUGH: 1
VOICE CHANGE: 0
SHORTNESS OF BREATH: 0
BACK PAIN: 0
VOMITING: 0
COUGH: 0
DIARRHEA: 0
CONSTIPATION: 0
ABDOMINAL PAIN: 0
ABDOMINAL DISTENTION: 0
SINUS PRESSURE: 0
APNEA: 0
CHEST TIGHTNESS: 0
RESPIRATORY NEGATIVE: 1
VOMITING: 0
SHORTNESS OF BREATH: 1
SORE THROAT: 0
CHOKING: 0
BACK PAIN: 0
VOMITING: 0
PHOTOPHOBIA: 0
DIARRHEA: 0
BACK PAIN: 0
ABDOMINAL PAIN: 0
SORE THROAT: 0
FACIAL SWELLING: 0
NAUSEA: 0
DIARRHEA: 0
EYE PAIN: 0
SORE THROAT: 0
EYES NEGATIVE: 1
BLOOD IN STOOL: 0
STRIDOR: 0
CONSTIPATION: 0
VOMITING: 0
COUGH: 0
EYE REDNESS: 0
ABDOMINAL PAIN: 0
EYE PAIN: 0
NAUSEA: 0
SINUS PRESSURE: 0
RHINORRHEA: 0
COUGH: 0
SHORTNESS OF BREATH: 1
EYE DISCHARGE: 0
WHEEZING: 1

## 2019-01-01 ASSESSMENT — PAIN SCALES - PAIN ASSESSMENT IN ADVANCED DEMENTIA (PAINAD)
FACIALEXPRESSION: 0
CONSOLABILITY: 0
NEGVOCALIZATION: 0
BREATHING: 0
BREATHING: 2
CONSOLABILITY: 0
CONSOLABILITY: 0
FACIALEXPRESSION: 0
CONSOLABILITY: 0
TOTALSCORE: 1
TOTALSCORE: 1
BREATHING: 0
CONSOLABILITY: 0
FACIALEXPRESSION: 0
BREATHING: 0
FACIALEXPRESSION: 2
TOTALSCORE: 0
TOTALSCORE: 0
BODYLANGUAGE: 1
BREATHING: 1
NEGVOCALIZATION: 0
NEGVOCALIZATION: 0
BREATHING: 2
CONSOLABILITY: 0
FACIALEXPRESSION: 0
BREATHING: 0
BREATHING: 0
NEGVOCALIZATION: 0
CONSOLABILITY: 0
CONSOLABILITY: 0
NEGVOCALIZATION: 0
BREATHING: 0
BREATHING: 0
NEGVOCALIZATION: 0
CONSOLABILITY: 0
TOTALSCORE: 1
TOTALSCORE: 0
CONSOLABILITY: 0
CONSOLABILITY: 0
NEGVOCALIZATION: 0
BREATHING: 1
NEGVOCALIZATION: 0
CONSOLABILITY: 0
FACIALEXPRESSION: 0
BREATHING: 0
CONSOLABILITY: 0
FACIALEXPRESSION: 0
NEGVOCALIZATION: 0
FACIALEXPRESSION: 0
CONSOLABILITY: 0
FACIALEXPRESSION: 0
BREATHING: 0
TOTALSCORE: 1
BODYLANGUAGE: 0
FACIALEXPRESSION: 1
NEGVOCALIZATION: 0
FACIALEXPRESSION: 0
CONSOLABILITY: 0
FACIALEXPRESSION: 0
FACIALEXPRESSION: 0
BODYLANGUAGE: 0
NEGVOCALIZATION: 0
TOTALSCORE: 2
CONSOLABILITY: 0
NEGVOCALIZATION: 0
CONSOLABILITY: 0
CONSOLABILITY: 0
BODYLANGUAGE: 0
FACIALEXPRESSION: 0
TOTALSCORE: 0
TOTALSCORE: 1
BREATHING: 0
NEGVOCALIZATION: 0
BREATHING: 1
TOTALSCORE: 1
BODYLANGUAGE: 0
CONSOLABILITY: 0
BODYLANGUAGE: 0
NEGVOCALIZATION: 0
BODYLANGUAGE: 0
BREATHING: 0
FACIALEXPRESSION: 0
CONSOLABILITY: 0
BODYLANGUAGE: 0
FACIALEXPRESSION: 1
NEGVOCALIZATION: 0
TOTALSCORE: 0
TOTALSCORE: 0
FACIALEXPRESSION: 0
NEGVOCALIZATION: 0
BREATHING: 1
FACIALEXPRESSION: 0
TOTALSCORE: 0
NEGVOCALIZATION: 0
FACIALEXPRESSION: 0
TOTALSCORE: 0
BREATHING: 0
CONSOLABILITY: 0
BODYLANGUAGE: 0
NEGVOCALIZATION: 0
TOTALSCORE: 0
BREATHING: 0
CONSOLABILITY: 0
NEGVOCALIZATION: 0
BODYLANGUAGE: 0
NEGVOCALIZATION: 0
TOTALSCORE: 0
BREATHING: 0
BODYLANGUAGE: 0
NEGVOCALIZATION: 0
CONSOLABILITY: 0
BREATHING: 0
BODYLANGUAGE: 0
CONSOLABILITY: 0
TOTALSCORE: 0
BREATHING: 0
NEGVOCALIZATION: 0
BODYLANGUAGE: 0
CONSOLABILITY: 0
BREATHING: 0
NEGVOCALIZATION: 0
BODYLANGUAGE: 0
TOTALSCORE: 5
BODYLANGUAGE: 0
TOTALSCORE: 0
NEGVOCALIZATION: 0
NEGVOCALIZATION: 0
FACIALEXPRESSION: 2
TOTALSCORE: 0
TOTALSCORE: 0
CONSOLABILITY: 0
BREATHING: 0
CONSOLABILITY: 0
BREATHING: 0
TOTALSCORE: 0
FACIALEXPRESSION: 0
NEGVOCALIZATION: 1
TOTALSCORE: 0
FACIALEXPRESSION: 0
BREATHING: 0
TOTALSCORE: 0
TOTALSCORE: 0
NEGVOCALIZATION: 0
NEGVOCALIZATION: 0
TOTALSCORE: 0
TOTALSCORE: 1
CONSOLABILITY: 0
NEGVOCALIZATION: 0
BREATHING: 0
CONSOLABILITY: 0
TOTALSCORE: 0
NEGVOCALIZATION: 0
FACIALEXPRESSION: 0
BODYLANGUAGE: 0
BODYLANGUAGE: 0
BREATHING: 0
TOTALSCORE: 0
FACIALEXPRESSION: 0
CONSOLABILITY: 0
FACIALEXPRESSION: 0
BODYLANGUAGE: 0
BREATHING: 0
TOTALSCORE: 0
BREATHING: 1
TOTALSCORE: 0
NEGVOCALIZATION: 0
BREATHING: 0
CONSOLABILITY: 0
BODYLANGUAGE: 0
BODYLANGUAGE: 1
BREATHING: 1
BREATHING: 0
BODYLANGUAGE: 0
BODYLANGUAGE: 0
CONSOLABILITY: 0
TOTALSCORE: 2
BREATHING: 0
BREATHING: 1
NEGVOCALIZATION: 0
BODYLANGUAGE: 0
NEGVOCALIZATION: 0
BODYLANGUAGE: 0
FACIALEXPRESSION: 0
CONSOLABILITY: 0
NEGVOCALIZATION: 0
FACIALEXPRESSION: 0
NEGVOCALIZATION: 0
BREATHING: 0
NEGVOCALIZATION: 0
BREATHING: 0
NEGVOCALIZATION: 0
TOTALSCORE: 1
CONSOLABILITY: 0
NEGVOCALIZATION: 1
BREATHING: 0
BODYLANGUAGE: 0
TOTALSCORE: 0
CONSOLABILITY: 0
NEGVOCALIZATION: 0
FACIALEXPRESSION: 0
BREATHING: 0
BODYLANGUAGE: 0
BODYLANGUAGE: 1
FACIALEXPRESSION: 0
NEGVOCALIZATION: 0
BODYLANGUAGE: 0
TOTALSCORE: 0
BODYLANGUAGE: 0
BREATHING: 2
BREATHING: 0
BODYLANGUAGE: 0
CONSOLABILITY: 0
BODYLANGUAGE: 0
FACIALEXPRESSION: 0
NEGVOCALIZATION: 0
FACIALEXPRESSION: 0
FACIALEXPRESSION: 0
BODYLANGUAGE: 0
NEGVOCALIZATION: 0
BREATHING: 0
NEGVOCALIZATION: 0
TOTALSCORE: 5
NEGVOCALIZATION: 0
BREATHING: 0
NEGVOCALIZATION: 0
TOTALSCORE: 0
BODYLANGUAGE: 0
CONSOLABILITY: 0
TOTALSCORE: 0
TOTALSCORE: 2
NEGVOCALIZATION: 0
CONSOLABILITY: 1
FACIALEXPRESSION: 0
TOTALSCORE: 0
BODYLANGUAGE: 0
CONSOLABILITY: 0
TOTALSCORE: 1
BODYLANGUAGE: 0
TOTALSCORE: 1
BODYLANGUAGE: 0
CONSOLABILITY: 0
FACIALEXPRESSION: 0
BREATHING: 1
CONSOLABILITY: 0
BODYLANGUAGE: 0
TOTALSCORE: 0
NEGVOCALIZATION: 0
FACIALEXPRESSION: 0
CONSOLABILITY: 0
CONSOLABILITY: 0
NEGVOCALIZATION: 0
BREATHING: 0
FACIALEXPRESSION: 0
CONSOLABILITY: 0
BREATHING: 0
CONSOLABILITY: 0
NEGVOCALIZATION: 0
NEGVOCALIZATION: 0
BODYLANGUAGE: 0
CONSOLABILITY: 0
TOTALSCORE: 0
BODYLANGUAGE: 0
TOTALSCORE: 0
BODYLANGUAGE: 1
NEGVOCALIZATION: 0
BREATHING: 0
CONSOLABILITY: 0
FACIALEXPRESSION: 0
CONSOLABILITY: 0
BODYLANGUAGE: 0
BREATHING: 0
FACIALEXPRESSION: 0
TOTALSCORE: 0
CONSOLABILITY: 0
BODYLANGUAGE: 0
CONSOLABILITY: 0
BREATHING: 0
CONSOLABILITY: 0
TOTALSCORE: 1
FACIALEXPRESSION: 0
NEGVOCALIZATION: 0
BREATHING: 0
FACIALEXPRESSION: 0
TOTALSCORE: 0
BODYLANGUAGE: 0
CONSOLABILITY: 0
BODYLANGUAGE: 0
BREATHING: 0
FACIALEXPRESSION: 0
TOTALSCORE: 0
NEGVOCALIZATION: 0
FACIALEXPRESSION: 0
FACIALEXPRESSION: 0
NEGVOCALIZATION: 1
NEGVOCALIZATION: 0
CONSOLABILITY: 0
BREATHING: 2
BODYLANGUAGE: 0
CONSOLABILITY: 0
NEGVOCALIZATION: 0
TOTALSCORE: 5
FACIALEXPRESSION: 1
BODYLANGUAGE: 0
FACIALEXPRESSION: 0
BODYLANGUAGE: 1
FACIALEXPRESSION: 0
TOTALSCORE: 0
CONSOLABILITY: 0
TOTALSCORE: 1
NEGVOCALIZATION: 0
BODYLANGUAGE: 0
BREATHING: 0
FACIALEXPRESSION: 0
BREATHING: 1
BODYLANGUAGE: 0
CONSOLABILITY: 0
NEGVOCALIZATION: 0
CONSOLABILITY: 0
BREATHING: 0
NEGVOCALIZATION: 0
BODYLANGUAGE: 1
TOTALSCORE: 2
BREATHING: 1
TOTALSCORE: 2
FACIALEXPRESSION: 0
NEGVOCALIZATION: 0
BODYLANGUAGE: 0
TOTALSCORE: 0
BREATHING: 0
BODYLANGUAGE: 0
TOTALSCORE: 0
BODYLANGUAGE: 0
BREATHING: 0
TOTALSCORE: 0
BODYLANGUAGE: 0
FACIALEXPRESSION: 0
NEGVOCALIZATION: 0
BODYLANGUAGE: 0
NEGVOCALIZATION: 0
TOTALSCORE: 0
FACIALEXPRESSION: 0
FACIALEXPRESSION: 0
CONSOLABILITY: 0
BODYLANGUAGE: 0
TOTALSCORE: 0
CONSOLABILITY: 0
NEGVOCALIZATION: 0
BREATHING: 0
FACIALEXPRESSION: 0
BREATHING: 0
CONSOLABILITY: 0
BODYLANGUAGE: 1
CONSOLABILITY: 0
BREATHING: 1
NEGVOCALIZATION: 0
CONSOLABILITY: 0
FACIALEXPRESSION: 0
CONSOLABILITY: 0
TOTALSCORE: 1
FACIALEXPRESSION: 0
BODYLANGUAGE: 0
FACIALEXPRESSION: 0
TOTALSCORE: 0
FACIALEXPRESSION: 0
FACIALEXPRESSION: 0
TOTALSCORE: 1
FACIALEXPRESSION: 1
TOTALSCORE: 2
BREATHING: 0
CONSOLABILITY: 0
CONSOLABILITY: 0
FACIALEXPRESSION: 2
BREATHING: 0
CONSOLABILITY: 0
FACIALEXPRESSION: 2
BODYLANGUAGE: 0
FACIALEXPRESSION: 0
BODYLANGUAGE: 0
FACIALEXPRESSION: 0
BREATHING: 1
BREATHING: 1
NEGVOCALIZATION: 0
BREATHING: 0
BODYLANGUAGE: 1
BODYLANGUAGE: 0
CONSOLABILITY: 0
BODYLANGUAGE: 0
BODYLANGUAGE: 0
CONSOLABILITY: 0
TOTALSCORE: 0
NEGVOCALIZATION: 0
NEGVOCALIZATION: 1
BREATHING: 0
BREATHING: 0
TOTALSCORE: 0
BREATHING: 0
TOTALSCORE: 1
BREATHING: 1
FACIALEXPRESSION: 1
NEGVOCALIZATION: 0
FACIALEXPRESSION: 0
FACIALEXPRESSION: 0
NEGVOCALIZATION: 0
FACIALEXPRESSION: 0
TOTALSCORE: 6
NEGVOCALIZATION: 0
BODYLANGUAGE: 0
BODYLANGUAGE: 0
TOTALSCORE: 1
NEGVOCALIZATION: 0
TOTALSCORE: 1
BREATHING: 1
TOTALSCORE: 0
TOTALSCORE: 0
BODYLANGUAGE: 0
TOTALSCORE: 0
NEGVOCALIZATION: 0
BREATHING: 1
CONSOLABILITY: 0
BODYLANGUAGE: 0
BREATHING: 0
NEGVOCALIZATION: 0
CONSOLABILITY: 0
CONSOLABILITY: 0

## 2019-01-01 ASSESSMENT — PAIN SCALES - GENERAL
PAINLEVEL_OUTOF10: 0

## 2019-02-17 PROBLEM — H90.3 SENSORINEURAL HEARING LOSS (SNHL) OF BOTH EARS: Status: ACTIVE | Noted: 2019-01-01

## 2019-02-17 PROBLEM — M41.05 INFANTILE IDIOPATHIC SCOLIOSIS OF THORACOLUMBAR REGION: Status: ACTIVE | Noted: 2019-01-01

## 2019-02-17 PROBLEM — F79 DISORDER OF INTELLECTUAL DEVELOPMENT: Status: ACTIVE | Noted: 2019-01-01

## 2019-02-17 PROBLEM — Z93.0 STATUS POST TRACHEOSTOMY (HCC): Status: ACTIVE | Noted: 2019-01-01

## 2019-02-17 PROBLEM — K59.01 SLOW TRANSIT CONSTIPATION: Status: ACTIVE | Noted: 2019-01-01

## 2019-02-17 PROBLEM — E78.2 MIXED HYPERLIPIDEMIA: Status: ACTIVE | Noted: 2019-01-01

## 2019-02-17 PROBLEM — Z86.718 HISTORY OF DVT OF LOWER EXTREMITY: Status: ACTIVE | Noted: 2019-01-01

## 2019-02-17 PROBLEM — Z79.2 LONG TERM (CURRENT) USE OF ANTIBIOTICS: Status: ACTIVE | Noted: 2019-01-01

## 2019-02-17 PROBLEM — M81.0 AGE-RELATED OSTEOPOROSIS WITHOUT CURRENT PATHOLOGICAL FRACTURE: Status: ACTIVE | Noted: 2019-01-01

## 2019-02-17 PROBLEM — R13.12 OROPHARYNGEAL DYSPHAGIA: Status: ACTIVE | Noted: 2019-01-01

## 2019-02-17 PROBLEM — K21.9 GASTROESOPHAGEAL REFLUX DISEASE WITHOUT ESOPHAGITIS: Status: ACTIVE | Noted: 2019-01-01

## 2019-02-17 PROBLEM — Z79.2 LONG TERM (CURRENT) USE OF ANTIBIOTICS: Status: RESOLVED | Noted: 2019-01-01 | Resolved: 2019-01-01

## 2019-02-17 PROBLEM — L03.311 ABDOMINAL WALL CELLULITIS: Status: RESOLVED | Noted: 2019-01-01 | Resolved: 2019-01-01

## 2019-02-17 PROBLEM — Z86.711 PERSONAL HISTORY OF PE (PULMONARY EMBOLISM): Status: ACTIVE | Noted: 2019-01-01

## 2019-02-17 PROBLEM — I10 ESSENTIAL HYPERTENSION: Status: RESOLVED | Noted: 2019-01-01 | Resolved: 2019-01-01

## 2019-02-17 PROBLEM — Z87.81 STATUS POST CLOSED FRACTURE OF LEFT FEMUR: Status: ACTIVE | Noted: 2019-01-01

## 2019-02-17 PROBLEM — E55.9 VITAMIN D DEFICIENCY: Status: ACTIVE | Noted: 2019-01-01

## 2019-02-17 PROBLEM — Z93.1 STATUS POST INSERTION OF PERCUTANEOUS ENDOSCOPIC GASTROSTOMY (PEG) TUBE (HCC): Status: ACTIVE | Noted: 2019-01-01

## 2019-02-17 PROBLEM — G80.1 SPASTIC DIPLEGIC CEREBRAL PALSY (HCC): Status: ACTIVE | Noted: 2019-01-01

## 2019-02-17 PROBLEM — I10 ESSENTIAL HYPERTENSION: Status: ACTIVE | Noted: 2019-01-01

## 2019-02-17 PROBLEM — L03.311 ABDOMINAL WALL CELLULITIS: Status: ACTIVE | Noted: 2019-01-01

## 2019-02-20 PROBLEM — J18.9 PNEUMONIA: Status: ACTIVE | Noted: 2019-01-01

## 2019-02-21 PROBLEM — G80.9 CEREBRAL PALSY (HCC): Status: ACTIVE | Noted: 2019-01-01

## 2019-04-01 NOTE — ED PROVIDER NOTES
[facility] ED  [address]  Phone: [phone] [facility] ED  eMERGENCY dEPARTMENT eNCOUnter      Pt Name: Suzanne Tinsley  MRN: [id number]  [place] 1950  Date of evaluation: 4/1/2019  Provider: Savage Clark DO    CHIEF COMPLAINT       Chief Complaint   Patient presents with    Other     patient needs order for oxygen         HISTORY OF PRESENT ILLNESS   (Location/Symptom, Timing/Onset,Context/Setting, Quality, Duration, Modifying Factors, Severity)  Note limiting factors. Suzanne Tinsley is a 68 y.o. male who presents to the emergency department for the evaluation of possible low pulse ox. Patient comes from long-term skilled nursing facility. Patient had been living in a group home in February and he developed pneumonia. He was admitted for a few days and underwent IV antibiotic treatment for pseudomonas in his lungs and a VRE urinary tract infection. Patient was discharged to long term skilled nursing facility. Per staff phone call today, has been doing okay but had a low pulse ox when they went to check on him tonight. There was no oxygen in order for when necessary oxygen. Patient otherwise had normal vitals, no change in mental status or demeanor, slight cough and no difficulty breathing. The patient is unable to contribute to the medical history secondary to the fact that he is nonverbal.    Nursing Notes were reviewed. REVIEW OF SYSTEMS    (2-9systems for level 4, 10 or more for level 5)     Review of Systems   Unable to perform ROS: Patient nonverbal (Limited review of systems obtained from nursing home report)   Constitutional: Negative for fever. Respiratory: Positive for cough. Gastrointestinal: Negative for vomiting. Except asnoted above the remainder of the review of systems was reviewed and negative.        PAST MEDICAL HISTORY     Past Medical History:   Diagnosis Date    Age-related osteoporosis without current pathological fracture 2/17/2019    Disorder of intellectual development 2/17/2019    Essential hypertension 2/17/2019    Gastroesophageal reflux disease without esophagitis 2/17/2019    History of DVT of lower extremity 2/17/2019    Infantile idiopathic scoliosis of thoracolumbar region 2/17/2019    Long term (current) use of antibiotics 2/17/2019    Mixed hyperlipidemia 2/17/2019    Oropharyngeal dysphagia 2/17/2019    Personal history of PE (pulmonary embolism) 2/17/2019    Sensorineural hearing loss (SNHL) of both ears 2/17/2019    Slow transit constipation 2/17/2019    Spastic diplegic cerebral palsy ([facility] 2/17/2019    Status post closed fracture of left femur 2/17/2019    Status post insertion of percutaneous endoscopic gastrostomy (PEG) tube ([facility] 2/17/2019    Status post tracheostomy ([facility] 2/17/2019    Vitamin D deficiency 2/17/2019         SURGICAL HISTORY     No past surgical history on file. CURRENT MEDICATIONS     Previous Medications    ACETAMINOPHEN (TYLENOL) 325 MG TABLET    Take 650 mg by mouth every 4 hours as needed for Pain or Fever    ALBUTEROL (PROVENTIL) (2.5 MG/3ML) 0.083% NEBULIZER SOLUTION    Take 3 mLs by nebulization three times daily    ATORVASTATIN (LIPITOR) 10 MG TABLET    10 mg by Per G Tube route daily     BISACODYL (DULCOLAX) 10 MG SUPPOSITORY    Place 10 mg rectally daily as needed for Constipation    CARBAMAZEPINE (TEGRETOL) 100 MG/5ML SUSPENSION    200 mg by Per G Tube route daily     CARBAMAZEPINE (TEGRETOL) 100 MG/5ML SUSPENSION    400 mg by Per G Tube route daily    DENOSUMAB (PROLIA) 60 MG/ML SOLN SC INJECTION    Inject 60 mg into the skin once    FAMOTIDINE (PEPCID) 20 MG TABLET    20 mg by Per G Tube route 2 times daily     GABAPENTIN (NEURONTIN) 400 MG CAPSULE    1 capsule by Per G Tube route 3 times daily for 30 days. Ania Farrar     GLYCOPYRROLATE (ROBINUL) 1 MG TABLET    1 mg by Per G Tube route 2 times daily     MULTIPLE VITAMINS-MINERALS (THERAPEUTIC Forced sexual activity: Not on file   Other Topics Concern    Not on file   Social History Narrative    Not on file       SCREENINGS             PHYSICAL EXAM    (up to 7 for level 4, 8 or more for level 5)     ED Triage Vitals [04/01/19 0520]   BP Temp Temp Source Pulse Resp SpO2 Height Weight   109/60 97.5 °F (36.4 °C) Tympanic 76 16 99 % -- --       Physical Exam   Constitutional: He appears well-developed and well-nourished. No distress. HENT:   Head: Normocephalic and atraumatic. Mouth/Throat: Oropharynx is clear and moist.   Eyes: Conjunctivae are normal. Right eye exhibits no discharge. Left eye exhibits no discharge. Neck: No tracheal deviation present. Tracheostomy tube in place   Cardiovascular: Normal rate, regular rhythm, normal heart sounds and intact distal pulses. Pulmonary/Chest: Effort normal and breath sounds normal. No stridor. No respiratory distress. He has no wheezes. He has no rales. Abdominal: Soft. He exhibits no distension. Musculoskeletal: Normal range of motion. He exhibits no edema or deformity. Neurological: He is alert. He exhibits normal muscle tone. Sitting up in bed, tracking me on examination, no focal deficits   Skin: Skin is warm. Capillary refill takes 2 to 3 seconds. Psychiatric: His behavior is normal.       EMERGENCY DEPARTMENT COURSE and DIFFERENTIAL DIAGNOSIS/MDM:   Vitals:    Vitals:    04/01/19 0520   BP: 109/60   Pulse: 76   Resp: 16   Temp: 97.5 °F (36.4 °C)   TempSrc: Tympanic   SpO2: 99%       Patient presents to the emergency department from [facility] for evaluation as described above. Vitals are normal, he is resting comfortably, no distress. It was stated by the nurse from the [facility] nursing facility that they basically needed a when necessary oxygen order for the patient.   However, based on his recent history were also going to get a chest x-ray to rule out any obvious developing infiltrates or abnormality in general.      DIAGNOSTIC RESULTS     LABS:  Labs Reviewed - No data to display    All other labs were within normal range or not returned as of this dictation. RADIOLOGY:  XR CHEST PORTABLE   ED Interpretation   CHEST X-RAY:  A single view portable chest x-ray was obtained, interpreted by radiology and reviewed by me. Impression: Patient has interstitial prominence bilaterally at the bases a near the right apex. This is significantly improved when compared with an x-ray on February 24, 2019. No pleural effusions or pneumothorax. Trach tube noted        Chest x-ray appears to be improving. Patient's vital signs are normal and the emergency department. His pulse ox was going anywhere from 90% to 99%. Patient having no respiratory distress. No significant cough. Resting comfortably with a grossly unremarkable examination. I reviewed the medical record at length, reviewed multiple previous x-rays. Reviewed orders from nursing home, patient not currently on any antibiotics, he does have order for humidified oxygen. We called the nursing home back, we confirmed this. They apparently did not realize that the order was there. They're now aware. He also has an order for his trach tube to be cleaned daily. He has no hypotension or fever, no tachycardia, grossly normal pulse ox for his history of chronic respiratory failure and trach dependence, improving chest x-ray. I don't feel any further workup is indicated at this time. She will be discharged back to the nursing home with instructions for them to actually use oxygen when necessary as it is ordered. Continue trach care. We discussed some things he would come back to the ER for           PROCEDURES:  Unless otherwise noted below, none     Procedures    FINAL IMPRESSION      1.  Tracheostomy complication, unspecified complication type [facility])          DISPOSITION/PLAN   DISPOSITION Decision To Discharge 04/01/2019 06:11:06 AM      PATIENT REFERRED TO:  Rahcel Gibson MD  [address] [facility]  [phone]    In 3 days        DISCHARGE MEDICATIONS:  New Prescriptions    No medications on file          (Please note that portions of this note were completed with a voice recognition program.  Efforts were made to edit the dictations but occasionally words are mis-transcribed.)    Margette Cushing, DO (electronically signed)  Board Certified Emergency Physician         Margette Cushing, DO  04/01/19 9564

## 2019-04-01 NOTE — ED TRIAGE NOTES
Per RN at [facility] patient is to have oxygen and does not have an order. He was recently in hospital for urinary sepsis and returned back to facility on Tuesday and did not have an O2 order. RN at facility stated patient spo2 was 60% but patient was not in distress, skin warm and dry, and was unable to report a blood pressure due to ambulance had already transported patient.  Per abulance patient spo2 94% upon their arrival.

## 2019-04-02 PROBLEM — B96.5 PSEUDOMONAS URINARY TRACT INFECTION: Status: ACTIVE | Noted: 2019-01-01

## 2019-04-02 PROBLEM — N39.0 PSEUDOMONAS URINARY TRACT INFECTION: Status: ACTIVE | Noted: 2019-01-01

## 2019-04-02 NOTE — ED NOTES
Bed: 16  Expected date: 4/2/19  Expected time: 5:17 PM  Means of arrival: Ambulance  Comments:  Bettepatricia Santoskin [birth year]. Pseudomonas in urine. Hx Seizure, HTN, Resp failure, Trach.  Allergy Lin Cohn RN  04/02/19 [id number]

## 2019-04-02 NOTE — ED PROVIDER NOTES
[address] [facility] ED  eMERGENCYdEPARTMENT eNCOUnter      Pt Name: Nora Jalloh  MRN: [id number]  [place] 1950  Date of evaluation: 4/2/2019  Ronald Stanley MD    CHIEF COMPLAINT           HPI  Nora Jalloh is a 68 y.o. male per chart review has a h/o intellectual disability, cerebral palsy, PEG tube placement, tracheostomy placement, jennings catheter use, and past history of sepsis, presents to the ED with +urine culture (pseudomonas) and +sputum culture (VRE). Patient non-verbal, presents without accompanying historian. All information gathered from chart and nursing home. ROS  Review of Systems   Constitutional: Negative for activity change, chills and fever. HENT: Negative for ear pain and sore throat. Eyes: Negative for visual disturbance. Respiratory: Negative for cough and shortness of breath. Cardiovascular: Negative for chest pain, palpitations and leg swelling. Gastrointestinal: Negative for abdominal pain, diarrhea, nausea and vomiting. Genitourinary: Negative for dysuria. Musculoskeletal: Negative for back pain. Skin: Negative for rash. Neurological: Negative for dizziness and weakness. Except as noted above the remainder of the review of systems was reviewed and negative.        PAST MEDICAL HISTORY     Past Medical History:   Diagnosis Date    Age-related osteoporosis without current pathological fracture 2/17/2019    Disorder of intellectual development 2/17/2019    Essential hypertension 2/17/2019    Gastroesophageal reflux disease without esophagitis 2/17/2019    History of DVT of lower extremity 2/17/2019    Infantile idiopathic scoliosis of thoracolumbar region 2/17/2019    Long term (current) use of antibiotics 2/17/2019    Mixed hyperlipidemia 2/17/2019    Oropharyngeal dysphagia 2/17/2019    Personal history of PE (pulmonary embolism) 2/17/2019    Sensorineural hearing loss (SNHL) of both ears 2/17/2019    Slow transit constipation 2/17/2019  Spastic diplegic cerebral palsy ([facility] 2/17/2019    Status post closed fracture of left femur 2/17/2019    [facility] of [facility] 2/17/2019    Status post tracheostomy ([facility] 2/17/2019    Vitamin D deficiency 2/17/2019         SURGICAL HISTORY     History reviewed. No pertinent surgical history. CURRENTMEDICATIONS       Previous Medications    ACETAMINOPHEN (TYLENOL) 325 MG TABLET    Take 650 mg by mouth every 4 hours as needed for Pain or Fever    ALBUTEROL (PROVENTIL) (2.5 MG/3ML) 0.083% NEBULIZER SOLUTION    Take 3 mLs by nebulization three times daily    ATORVASTATIN (LIPITOR) 10 MG TABLET    10 mg by Per G Tube route daily     BISACODYL (DULCOLAX) 10 MG SUPPOSITORY    Place 10 mg rectally daily as needed for Constipation    CARBAMAZEPINE (TEGRETOL) 100 MG/5ML SUSPENSION    200 mg by Per G Tube route daily     CARBAMAZEPINE (TEGRETOL) 100 MG/5ML SUSPENSION    400 mg by Per G Tube route daily    DENOSUMAB (PROLIA) 60 MG/ML SOLN SC INJECTION    Inject 60 mg into the skin once    FAMOTIDINE (PEPCID) 20 MG TABLET    20 mg by Per G Tube route 2 times daily     GABAPENTIN (NEURONTIN) 400 MG CAPSULE    1 capsule by Per G Tube route 3 times daily for 30 days. Conde Challenger     GLYCOPYRROLATE (ROBINUL) 1 MG TABLET    1 mg by Per G Tube route 2 times daily     MULTIPLE VITAMINS-MINERALS (THERAPEUTIC MULTIVITAMIN-MINERALS) TABLET    Take 1 tablet by mouth daily     OYSTER SHELL 500 MG TABS    500 mg by Per G Tube route 2 times daily     PANTOPRAZOLE SODIUM (PROTONIX) 40 MG PACK PACKET    Take 1 packet by mouth every morning (before breakfast)    POLYETHYLENE GLYCOL (GLYCOLAX) POWDER    Take 17 g by mouth daily    PREDNISONE (DELTASONE) 10 MG TABLET    Take 10 mg by mouth daily     SENNOSIDES-DOCUSATE SODIUM (SENOKOT-S) 8.6-50 MG TABLET    2 tablets by Per G Tube route daily    VITAMIN D (CHOLECALCIFEROL) 1000 UNIT TABS TABLET    5,000 Units by Per G Tube route daily ALLERGIES     Ciprofloxacin hcl and Zosyn [piperacillin sod-tazobactam so]    FAMILY HISTORY     History reviewed. No pertinent family history. SOCIAL HISTORY       Social History     Socioeconomic History    Marital status: Single     Spouse name: None    Number of children: None    Years of education: None    Highest education level: None   Occupational History    None   Social Needs    Financial resource strain: None    Food insecurity:     Worry: None     Inability: None    Transportation needs:     Medical: None     Non-medical: None   Tobacco Use    Smoking status: Never Smoker    Smokeless tobacco: Never Used   Substance and Sexual Activity    Alcohol use: No    Drug use: No    Sexual activity: Never   Lifestyle    Physical activity:     Days per week: None     Minutes per session: None    Stress: None   Relationships    Social connections:     Talks on phone: None     Gets together: None     Attends [employer] service: None     Active member of club or organization: None     Attends meetings of clubs or organizations: None     Relationship status: None    Intimate partner violence:     Fear of current or ex partner: None     Emotionally abused: None     Physically abused: None     Forced sexual activity: None   Other Topics Concern    None   Social History Narrative    None         PHYSICAL EXAM       ED Triage Vitals [04/02/19 1801]   BP Temp Temp Source Pulse Resp SpO2 Height Weight   115/71 97.3 °F (36.3 °C) Temporal 69 16 100 % 5' (1.524 m) 154 lb (69.9 kg)       Physical Exam   Constitutional: He is oriented to person, place, and time. He appears well-developed. HENT:   Head: Normocephalic. Right Ear: External ear normal.   Left Ear: External ear normal.   Mouth/Throat: Oropharynx is clear and moist.   Eyes: Pupils are equal, round, and reactive to light. Conjunctivae are normal.   Neck: Normal range of motion. Neck supple.    Cardiovascular: Normal rate, regular rhythm and normal heart sounds. Pulmonary/Chest: Effort normal.   Artifical breath sounds throughout   Abdominal: Soft. Bowel sounds are normal. He exhibits no distension. There is no tenderness. Genitourinary:   Genitourinary Comments: Indwelling jennings catheter, yellow urine without evidence of blood. Musculoskeletal: Normal range of motion. Contractures throughout. Neurological: He is alert and oriented to person, place, and time. Skin: Skin is warm and dry. Psychiatric: He has a normal mood and affect. Nursing note and vitals reviewed. MDM  68year old male presents to the ED with +urine cultures (Pseudomonas) and +sputum cultures (VRE). Patient given 1 L IV NS in the ED. EKG shows NSR with HR 65, normal axis, normal intervals, no ST changes. Labs remarkable for WBC 11.5.  UA shows UTI. CXR shows chronic LLL infiltrate. Urine culture from NH reviewed. Pt's pseudomonas infection only sensitive to amikacin and zosyn. Pt with an allergy to zosyn. Unsure of reaction. Pt not given IV zosyn due to possibility of anaphylaxis. Pt given IV amikacin in the ED. Given pt with pseudomonas UTI, sputum culture positive for VRE, case discussed with Dr. Marvel Givens and pt admitted to medicine in stable condition. NH informed. FINAL IMPRESSION      1. Acute tracheitis without airway obstruction    2. Pneumonia due to organism    3.  Acute cystitis without hematuria          DISPOSITION/PLAN   DISPOSITION Decision To Admit 04/02/2019 08:00:05 PM        DISCHARGE MEDICATIONS:  [unfilled]         Nataly Rodas MD(electronically signed)  Attending Emergency Physician            Nataly Rodas MD  04/02/19 2011

## 2019-04-02 NOTE — CARE COORDINATION
Pt resides at [address] [place] [address] in [facility]. Phone: [phone]. I called and spoke to April, pt's nurse. She states pseudomonas is in the urine, she will fax sputum c/s. She will call with other information on pt's symptoms and vital signs.  Pt is nonverbal.

## 2019-04-02 NOTE — ED TRIAGE NOTES
Pt arrived via [employer] squad from [place] in [facility]. Pt alert, nonverbal. Playing with one of his 2 balls that he has with him. No respiratory distress noted. Lungs with upper airway sounds noted but fairly clear in bases. Painting intact. No distress noted at this time. Per [facility] report, they found VRE in his urine and Pseudomonas in his sputum. Dr Ramandeep Triana was requesting infectious disease to treat. Sensitive only to Amikacin antibiotic.

## 2019-04-02 NOTE — CARE COORDINATION
I received a call from April at [facility]. She states she cannot find the sputum c/s report. She states that the report had said that there was no VRE in the sputum. She states there were no noted symptoms or abnormal vital signs for the pt.

## 2019-04-02 NOTE — ED NOTES
Assumed care of patient and received report from [facility]. Lab is at bedside obtaining lab work  Patient is resting with no acute distress at this time.   SR up x2 for safety     Marilynn Sauceda RN  04/02/19 1928

## 2019-04-03 NOTE — CONSULTS
Consults   Infectious Disease     Patient Name: Latasha Dunn  Date: 4/3/2019  YOB: 1950  Medical Record Number: [id number]      Urinary tract infection        History of Present Illness:  Chronic tracheostomy tube  Hypertension DVT cerebral palsy      No fevers  4/1 came to the emergency department with decreasing pulse ox from a group home And treated for pneumonia  Cultures from February grew pseudomonas in sputum and VRE in the urine    Pseudomonas in the urine from group home    Was given amikacin in the emergency room    Patient unable to give history      SOURCE: Sputum Expectorated Sputum         COLLECTED:  02/20/19 16:54  ANTIBIOTICS AT [facility]. :                      RECEIVED :  02/20/19 16:54   Susceptibility     Pseudomonas aeruginosa (1)     Antibiotic Interpretation CRISTINA Status    cefepime Intermediate 8 mcg/mL     ciprofloxacin Resistant >=4 mcg/mL     gentamicin Intermediate 8 mcg/mL     imipenem Resistant >=16 mcg/mL     levofloxacin Resistant >=8 mcg/mL     tobramycin Sensitive 4 mcg/mL             Respiratory Culture [[id number]] Collected: 04/02/19 2038   Order Status: Sent Specimen: Sputum, Suctioned Updated: 04/03/19 1205    Gram Stain Result Few WBC's   No epithelial cells   Many Small Gram positive rods   Moderate Gram negative rods    Narrative:     ORDER#: [id number]                          ORDERED BY: CARLOTTA Mays  SOURCE: Sputum Suctioned                   COLLECTED:  04/02/19 20:38  ANTIBIOTICS AT [facility]. :                      RECEIVED :  04/02/19 20:38   Urine Culture [[id number]] Collected: 04/02/19 2000   Order Status: Sent Specimen: Urine, clean catch Updated: 04/03/19 0945   Culture Blood #2 [[id number]] Collected: 04/02/19 1937   Order Status: Sent Specimen: Blood Updated: 04/02/19 1942   Culture Blood #1 [[id number]] Collected: 04/02/19 1925   Order Status: Sent Specimen: Blood Updated: 04/02/19 1925           Urinalysis shows significant inflammation    Microscopic Urinalysis tracheostomy ([facility] [address] 2/17/2019    Vitamin D deficiency 2/17/2019           History reviewed. No pertinent surgical history. No current facility-administered medications on file prior to encounter. Current Outpatient Medications on File Prior to Encounter   Medication Sig Dispense Refill    albuterol (PROVENTIL) (2.5 MG/3ML) 0.083% nebulizer solution Take 3 mLs by nebulization three times daily 120 each 3    polyethylene glycol (GLYCOLAX) powder Take 17 g by mouth daily      denosumab (PROLIA) 60 MG/ML SOLN SC injection Inject 60 mg into the skin once      carBAMazepine (TEGRETOL) 100 MG/5ML suspension 400 mg by Per G Tube route daily      gabapentin (NEURONTIN) 400 MG capsule 1 capsule by Per G Tube route 3 times daily for 30 days. . 90 capsule 0    atorvastatin (LIPITOR) 10 MG tablet 10 mg by Per G Tube route daily       carBAMazepine (TEGRETOL) 100 MG/5ML suspension 200 mg by Per G Tube route daily       famotidine (PEPCID) 20 MG tablet 20 mg by Per G Tube route 2 times daily       glycopyrrolate (ROBINUL) 1 MG tablet 1 mg by Per G Tube route 2 times daily       Oyster Shell 500 MG TABS 500 mg by Per G Tube route 2 times daily       acetaminophen (TYLENOL) 325 MG tablet Take 650 mg by mouth every 4 hours as needed for Pain or Fever      bisacodyl (DULCOLAX) 10 MG suppository Place 10 mg rectally daily as needed for Constipation      predniSONE (DELTASONE) 10 MG tablet Take 10 mg by mouth daily       sennosides-docusate sodium (SENOKOT-S) 8.6-50 MG tablet 2 tablets by Per G Tube route daily      vitamin D (CHOLECALCIFEROL) 1000 UNIT TABS tablet 5,000 Units by Per G Tube route daily      Multiple Vitamins-Minerals (THERAPEUTIC MULTIVITAMIN-MINERALS) tablet Take 1 tablet by mouth daily       pantoprazole sodium (PROTONIX) 40 MG PACK packet Take 1 packet by mouth every morning (before breakfast) 30 each 3       Allergies   Allergen Reactions    Ciprofloxacin Hcl      Possible drug rash unknown    Zosyn [Piperacillin Sod-Tazobactam So]      Unknown reaction         History reviewed. No pertinent family history. Physical Exam:      Physical Exam   Eyes: Conjunctivae are normal.   Neck: Normal range of motion. Neck supple. Cardiovascular: Normal rate and normal heart sounds. No murmur heard. Pulmonary/Chest: Effort normal. No respiratory distress. He has no wheezes. He has no rales. He exhibits no tenderness. Abdominal: Soft. He exhibits no distension and no mass. There is no hepatosplenomegaly, splenomegaly or hepatomegaly. There is no tenderness. There is no rebound, no guarding and no CVA tenderness. Musculoskeletal: He exhibits no edema or tenderness. Lymphadenopathy:     He has no cervical adenopathy. Skin: Skin is warm. No erythema. Blood pressure (!) 88/51, pulse 64, temperature 97.9 °F (36.6 °C), temperature source Oral, resp. rate 16, height 5' (1.524 m), weight 138 lb 10.7 oz (62.9 kg), SpO2 100 %.       .   Lab Results   Component Value Date    WBC 10.6 04/03/2019    HGB 11.2 (L) 04/03/2019    HCT 33.3 (L) 04/03/2019    MCV 93.5 04/03/2019     04/03/2019     Lab Results   Component Value Date     04/02/2019    K 4.2 04/02/2019    CL 98 04/02/2019    CO2 27 04/02/2019    BUN 19 04/02/2019    CREATININE 0.32 04/02/2019    GLUCOSE 92 04/02/2019    CALCIUM 8.3 04/02/2019                ASSESSMENT:  Patient Active Problem List   Diagnosis    Disorder of intellectual development    Gastroesophageal reflux disease without esophagitis    Cerebral palsy ([address]    Mixed hyperlipidemia    Vitamin D deficiency    Sensorineural hearing loss (SNHL) of both ears    Infantile idiopathic scoliosis of thoracolumbar region    Age-related osteoporosis without current pathological fracture    Status post insertion of percutaneous endoscopic gastrostomy (PEG) tube (HCC)    Status post tracheostomy ([address]    Status post closed fracture of left femur    Slow transit constipation    Personal history of PE (pulmonary embolism)    History of DVT of lower extremity    Oropharyngeal dysphagia    Pneumonia    Sepsis ([address]    Acute on chronic respiratory failure with hypoxia (HCC)    Pseudomonas urinary tract infection         PLAN:    Urinary tract infection given urinalysis    Sputum is colonized because patient has trach growth is not significant      Based on culture results from group home continue amikacin at this time to cover pseudomonas in urine

## 2019-04-03 NOTE — CARE COORDINATION
Pt is from [address] [place] [address] in [place] and will return there when medically stable for DC.

## 2019-04-03 NOTE — H&P
[facility]  MEDICINE    HISTORY AND PHYSICAL EXAM    PATIENT NAME:  Ayo Gong    MRN:  [id number]  SERVICE DATE:  4/2/2019   SERVICE TIME:  9:19 PM    Primary Care Physician: Meryl Mcgovern MD       SUBJECTIVE  CHIEF COMPLAINT:  Positive urine culture    HPI:  This is a 68 y.o. male who presents with urine culture positive for pseudomonas. Pt is unaccompanied and unable to contribute to HPI. He appears comfortable and is afebrile. Pt sent by group home for evaluation. Amikin administered in ED along w/NS IVF. PAST MEDICAL HISTORY:    Past Medical History:   Diagnosis Date    Age-related osteoporosis without current pathological fracture 2/17/2019    Disorder of intellectual development 2/17/2019    Essential hypertension 2/17/2019    Gastroesophageal reflux disease without esophagitis 2/17/2019    History of DVT of lower extremity 2/17/2019    Infantile idiopathic scoliosis of thoracolumbar region 2/17/2019    Long term (current) use of antibiotics 2/17/2019    Mixed hyperlipidemia 2/17/2019    Oropharyngeal dysphagia 2/17/2019    Personal history of PE (pulmonary embolism) 2/17/2019    Sensorineural hearing loss (SNHL) of both ears 2/17/2019    Slow transit constipation 2/17/2019    Spastic diplegic cerebral palsy ([address] 2/17/2019    Status post closed fracture of left femur 2/17/2019    Status post insertion of percutaneous endoscopic gastrostomy (PEG) tube ([address] 2/17/2019    Status post tracheostomy ([address] 2/17/2019    Vitamin D deficiency 2/17/2019     PAST SURGICAL HISTORY:  History reviewed. No pertinent surgical history. FAMILY HISTORY:  History reviewed. No pertinent family history.   SOCIAL HISTORY:    Social History     Socioeconomic History    Marital status: Single     Spouse name: Not on file    Number of children: Not on file    Years of education: Not on file    Highest education level: Not on file   Occupational History    Not on file   Social Needs    Financial resource strain: Not on file    Food insecurity:     Worry: Not on file     Inability: Not on file    Transportation needs:     Medical: Not on file     Non-medical: Not on file   Tobacco Use    Smoking status: Never Smoker    Smokeless tobacco: Never Used   Substance and Sexual Activity    Alcohol use: No    Drug use: No    Sexual activity: Never   Lifestyle    Physical activity:     Days per week: Not on file     Minutes per session: Not on file    Stress: Not on file   Relationships    Social connections:     Talks on phone: Not on file     Gets together: Not on file     Attends [employer] service: Not on file     Active member of club or organization: Not on file     Attends meetings of clubs or organizations: Not on file     Relationship status: Not on file    Intimate partner violence:     Fear of current or ex partner: Not on file     Emotionally abused: Not on file     Physically abused: Not on file     Forced sexual activity: Not on file   Other Topics Concern    Not on file   Social History Narrative    Not on file     MEDICATIONS:   Prior to Admission medications    Medication Sig Start Date End Date Taking? Authorizing Provider   albuterol (PROVENTIL) (2.5 MG/3ML) 0.083% nebulizer solution Take 3 mLs by nebulization three times daily 2/27/19  Yes Larene Phlegm, DO   pantoprazole sodium (PROTONIX) 40 MG PACK packet Take 1 packet by mouth every morning (before breakfast) 2/28/19  Yes Levy Dago Sam, DO   polyethylene glycol (GLYCOLAX) powder Take 17 g by mouth daily   Yes Historical Provider, MD   denosumab (PROLIA) 60 MG/ML SOLN SC injection Inject 60 mg into the skin once   Yes Historical Provider, MD   carBAMazepine (TEGRETOL) 100 MG/5ML suspension 400 mg by Per G Tube route daily   Yes Historical Provider, MD   gabapentin (NEURONTIN) 400 MG capsule 1 capsule by Per G Tube route 3 times daily for 30 days. . 2/13/19 4/2/19 Yes GIANCARLO Fleming - CNP   atorvastatin (LIPITOR) 10 MG tablet 10 mg by Per G Tube route daily    Yes Historical Provider, MD   carBAMazepine (TEGRETOL) 100 MG/5ML suspension 200 mg by Per G Tube route daily    Yes Historical Provider, MD   famotidine (PEPCID) 20 MG tablet 20 mg by Per G Tube route 2 times daily    Yes Historical Provider, MD   glycopyrrolate (ROBINUL) 1 MG tablet 1 mg by Per G Tube route 2 times daily    Yes Historical Provider, MD   Oyster Shell 500 MG TABS 500 mg by Per G Tube route 2 times daily    Yes Historical Provider, MD   acetaminophen (TYLENOL) 325 MG tablet Take 650 mg by mouth every 4 hours as needed for Pain or Fever   Yes Historical Provider, MD   bisacodyl (DULCOLAX) 10 MG suppository Place 10 mg rectally daily as needed for Constipation   Yes Historical Provider, MD   predniSONE (DELTASONE) 10 MG tablet Take 10 mg by mouth daily    Yes Historical Provider, MD   sennosides-docusate sodium (SENOKOT-S) 8.6-50 MG tablet 2 tablets by Per G Tube route daily   Yes Historical Provider, MD   vitamin D (CHOLECALCIFEROL) 1000 UNIT TABS tablet 5,000 Units by Per G Tube route daily   Yes Historical Provider, MD   Multiple Vitamins-Minerals (THERAPEUTIC MULTIVITAMIN-MINERALS) tablet Take 1 tablet by mouth daily    Yes Historical Provider, MD       ALLERGIES: Ciprofloxacin hcl and Zosyn [piperacillin sod-tazobactam so]    REVIEW OF SYSTEM:   ROS as noted in HPI, 12 point ROS reviewed and otherwise negative.     OBJECTIVE  PHYSICAL EXAM: /64   Pulse 68   Temp 97.3 °F (36.3 °C) (Temporal)   Resp 18   Ht 5' (1.524 m)   Wt 154 lb (69.9 kg)   SpO2 100%   BMI 30.08 kg/m²     CONSTITUTIONAL:  alert and no apparent distress  EYES:  sclera clear and conjunctiva normal  ENT:  atraumatic, poor dentition, dry mucosa  NECK:  Trachea present, no signs of infection  LUNGS:  no increased work of breathing and wheeze occasional wheezing  CARDIOVASCULAR:  normal apical pulses and normal S1 and S2  ABDOMEN:  normal bowel sounds, PEG site clean and dry and non-tender  MUSCULOSKELETAL:  Chronic BLE flexion, + distal pulses  NEUROLOGIC:  Mental Status Exam:  Level of Alertness:   awake  Orientation:   none  Attention/Concentration:  abnormal - not directable  Language:  abnormal - non-verbal  SKIN:  normal skin color, texture, turgor    DATA:     Diagnostic tests reviewed for today's visit:    Most recent labs and imaging results reviewed.      LABS:    Recent Results (from the past 24 hour(s))   Comprehensive Metabolic Panel    Collection Time: 04/02/19  6:30 PM   Result Value Ref Range    Sodium 141 135 - 144 mEq/L    Potassium 4.5 3.4 - 4.9 mEq/L    Chloride 102 95 - 107 mEq/L    CO2 27 20 - 31 mEq/L    Anion Gap 12 9 - 15 mEq/L    Glucose 92 70 - 99 mg/dL    BUN 21 8 - 23 mg/dL    CREATININE 0.38 (L) 0.70 - 1.20 mg/dL    GFR Non-African American >60.0 >60    GFR  >60.0 >60    Calcium 9.0 8.5 - 9.9 mg/dL    Total Protein 7.3 6.3 - 8.0 g/dL    Alb 3.9 3.5 - 4.6 g/dL    Total Bilirubin <0.2 0.2 - 0.7 mg/dL    Alkaline Phosphatase 100 35 - 104 U/L    ALT 17 0 - 41 U/L    AST 20 0 - 40 U/L    Globulin 3.4 2.3 - 3.5 g/dL   CBC Auto Differential    Collection Time: 04/02/19  6:30 PM   Result Value Ref Range    WBC 11.5 (H) 4.8 - 10.8 K/uL    RBC 4.01 (L) 4.70 - 6.10 M/uL    Hemoglobin 12.9 (L) 14.0 - 18.0 g/dL    Hematocrit 36.8 (L) 42.0 - 52.0 %    MCV 91.8 80.0 - 100.0 fL    MCH 32.1 (H) 27.0 - 31.3 pg    MCHC 35.0 33.0 - 37.0 %    RDW 13.6 11.5 - 14.5 %    Platelets 108 349 - 510 K/uL    Neutrophils % 55.7 %    Lymphocytes % 26.6 %    Monocytes % 11.3 %    Eosinophils % 5.2 %    Basophils % 1.2 %    Neutrophils # 6.4 1.4 - 6.5 K/uL    Lymphocytes # 3.0 1.0 - 4.8 K/uL    Monocytes # 1.3 (H) 0.2 - 0.8 K/uL    Eosinophils # 0.6 0.0 - 0.7 K/uL    Basophils # 0.1 0.0 - 0.2 K/uL   Magnesium    Collection Time: 04/02/19  6:30 PM   Result Value Ref Range    Magnesium 2.4 1.7 - 2.4 mg/dL   Troponin    Collection Time: 04/02/19  6:30 PM   Result Value Ref Range Troponin <0.010 0.000 - 0.010 ng/mL   Urinalysis    Collection Time: 04/02/19  6:30 PM   Result Value Ref Range    Color, UA Yellow Straw/Yellow    Clarity, UA TURBID (A) Clear    Glucose, Ur Negative Negative mg/dL    Bilirubin Urine Negative Negative    Ketones, Urine Negative Negative mg/dL    Specific Gravity, UA 1.015 1.005 - 1.030    Blood, Urine TRACE (A) Negative    pH, UA 7.5 5.0 - 9.0    Protein, UA Negative Negative mg/dL    Urobilinogen, Urine 0.2 <2.0 E.U./dL    Nitrite, Urine POSITIVE (A) Negative    Leukocyte Esterase, Urine LARGE (A) Negative   Lactic Acid, Plasma    Collection Time: 04/02/19  6:30 PM   Result Value Ref Range    Lactic Acid 0.6 0.5 - 2.2 mmol/L   Microscopic Urinalysis    Collection Time: 04/02/19  6:30 PM   Result Value Ref Range    Bacteria, UA FEW (A) /HPF    Hyaline Casts, UA 5-10 0 - 5 /HPF    WBC, UA >100 (H) 0 - 5 /HPF    RBC, UA 10-20 (A) 0 - 5 /HPF    Epi Cells 0-2 0 - 5 /HPF   EKG 12 Lead - Chest Pain    Collection Time: 04/02/19  6:44 PM   Result Value Ref Range    Ventricular Rate 65 BPM    Atrial Rate 65 BPM    P-R Interval 160 ms    QRS Duration 72 ms    Q-T Interval 426 ms    QTc Calculation (Bazett) 443 ms    P Axis 6 degrees    R Axis -2 degrees    T Axis 18 degrees       IMAGING:  No results found. VTE Prophylaxis: low molecular weight heparin -  start    ASSESSMENT AND PLAN  Pseudomonas UTI  Hx VRE sputum s/p tracheostomy  CP  Dysphagia s/p PEG  HTN  MRDD      Admit inpt w/tele  Consult ID, will await their input regarding abx tx  Await blood and urine cultures  Sputum cultures pending  NS IVF  duoneb aerosols   Cbc, bmp, Mg in am      I saw and evaluated the pt.  I personally obtained the key and critical portions of the history and physical exam. I reviewed the mild level documentation and agree with assessment and plan that we come up together  Electronically signed by Nate Pickard MD on 4/2/19 at 11:24 PM      SIGNATURE: Sonia Orona PA-C  DATE: April 2,

## 2019-04-03 NOTE — PROGRESS NOTES
Hospitalist Progress Note      PCP: Reed Elias MD    Date of Admission: 4/2/2019    Chief Complaint:  Patient seen and examined, is requiring frequent suctioning due to thick secretions per nursing report, afebrile, stable HD    Medications:  Reviewed    Infusion Medications   Scheduled Medications    sodium chloride flush  10 mL Intravenous 2 times per day    enoxaparin  40 mg Subcutaneous Daily    amikacin (AMIKIN) IVPB  5 mg/kg (Ideal) Intravenous Q8H    atorvastatin  10 mg Per G Tube Daily    famotidine  20 mg Per G Tube BID    glycopyrrolate  1 mg Per G Tube BID    calcium elemental  500 mg Per G Tube BID    predniSONE  10 mg Oral Daily    sennosides-docusate sodium  2 tablet Per G Tube Daily    vitamin D  5,000 Units Per G Tube Daily    CENTRUM/CERTA-NIOCLE with minerals oral  15 mL Per G Tube Daily    gabapentin  400 mg Per G Tube TID    polyethylene glycol  17 g Oral Daily    carBAMazepine  400 mg Per G Tube Daily    albuterol  2.5 mg Nebulization TID     PRN Meds: albuterol, sodium chloride flush, acetaminophen, bisacodyl      Intake/Output Summary (Last 24 hours) at 4/3/2019 1149  Last data filed at 4/2/2019 2130  Gross per 24 hour   Intake 100 ml   Output 800 ml   Net -700 ml       Exam:    BP (!) 88/51   Pulse 64   Temp 97.7 °F (36.5 °C) (Oral)   Resp 16   Ht 5' (1.524 m)   Wt 138 lb 10.7 oz (62.9 kg)   SpO2 100%   BMI 27.08 kg/m²     General appearance: chronically ill -appearing man, mild distress,Trach present. Respiratory:  Bronchial sounds, diminished BS bilaterally . Cardiovascular: Regular rate and rhythm with normal S1/S2.   Abdomen: Soft, non-tender, non-distended, PEG tube present, jennings present  Musculoskeletal: LE contracted , no edema     Labs:   Recent Labs     04/02/19  1830 04/03/19  0533   WBC 11.5* 10.6   HGB 12.9* 11.2*   HCT 36.8* 33.3*    241     Recent Labs     04/02/19  1830 04/02/19  2250    135   K 4.5 4.2    98   CO2 27 27   BUN 21 19 CREATININE 0.38* 0.32*   CALCIUM 9.0 8.3*     Recent Labs     04/02/19  1830 04/02/19  2250   AST 20 24   ALT 17 16   BILITOT <0.2 <0.2   ALKPHOS 100 84     No results for input(s): INR in the last 72 hours. Recent Labs     04/02/19  1830   TROPONINI <0.010       Urinalysis:      Lab Results   Component Value Date    NITRU POSITIVE 04/02/2019    WBCUA >100 04/02/2019    BACTERIA FEW 04/02/2019    RBCUA 10-20 04/02/2019    BLOODU TRACE 04/02/2019    SPECGRAV 1.015 04/02/2019    GLUCOSEU Negative 04/02/2019       Radiology:  XR CHEST PORTABLE   Final Result   RETICULAR NODULAR INFILTRATES LUNG BASES, LEFT GREATER THAN RIGHT, SLIGHTLY WORSE IN RIGHT BASE. Assessment/Plan:    68 y.o. male with history of intellectual disability, cerebral palsy, s/p PEG and tracheostomy placement, chronic Painting catheter, and past history of sepsis who presented with: P. Aeruginosa UTI   - likely related to chronic Painting catheter(POA)  - on IV amikacin  - ID consulted    Possible PNA  - is requiring frequent suctioning due to thick secretions  - continue Duonebs  - consult pulmonary service    Dysphagia s/p PEG  - continue nutrition via PEG tube            Electronically signed by Horacio Byrd MD on 4/3/2019 at 11:49 AM

## 2019-04-03 NOTE — CONSULTS
Pulmonary and Critical Care Medicine  Consult Note  Encounter Date: 4/3/2019 5:27 PM    Mr. Kris Li is a 68 y.o. male  : 1950  Requesting Provider: Emmanuelle Tracy MD    Reason for request: PNA             HISTORY OF PRESENT ILLNESS:    Patient is 68 y.o. history of mental retardation, he is unable to provide history, history obtained from chart, he has tracheostomy, with history of cerebral palsy. Presents to emergency room with positive urine culture [Pseudomonas], also sputum culture grew VRE. No reported fever, he does have thick yellow mucus coming from tracheostomy, he is on trach collar which is his baseline. No further history is obtainable          Past Medical History:        Diagnosis Date    Age-related osteoporosis without current pathological fracture 2019    Disorder of intellectual development 2019    Essential hypertension 2019    Gastroesophageal reflux disease without esophagitis 2019    History of DVT of lower extremity 2019    Infantile idiopathic scoliosis of thoracolumbar region 2019    Long term (current) use of antibiotics 2019    Mixed hyperlipidemia 2019    Oropharyngeal dysphagia 2019    Personal history of PE (pulmonary embolism) 2019    Sensorineural hearing loss (SNHL) of both ears 2019    Slow transit constipation 2019    Spastic diplegic cerebral palsy ([address] 2019    Status post closed fracture of left femur 2019    Status post insertion of percutaneous endoscopic gastrostomy (PEG) tube ([address] 2019    Status post tracheostomy ([address] 2019    Vitamin D deficiency 2019       Past Surgical History:    History reviewed. No pertinent surgical history. Social History:     reports that he has never smoked. He has never used smokeless tobacco. He reports that he does not drink alcohol or use drugs. Family History:   History reviewed. No pertinent family history.   Not 36.8* 33.3*    241      Recent Labs     04/02/19  1830 04/02/19  2250    135   K 4.5 4.2    98   CO2 27 27   BUN 21 19   CREATININE 0.38* 0.32*   GLUCOSE 92 92       MV Settings: ABGs: No results for input(s): PHART, YAQ8IFF, PO2ART, OHE1BRA, BEART, U3BIUTBD, VFD8EHL in the last 72 hours. O2 Device: Trach mask  O2 Flow Rate (L/min): 8 L/min  Lab Results   Component Value Date    LACTA 0.6 04/02/2019    LACTA 1.9 02/20/2019       Radiology  I personally reviewed imaging studies and no new infiltrate          Assessment, plan:   Patient is at risk due to    · Chronic resp failure, S/P tracheostomy   · Pseudomonas colonization   · Increased secretions ?  Tracheobronchitsi, no fever and CXR negative, repeat cultures pending   · H/O PE     Recommendations   · Cont pulmonary hygiene   · Add vest   · If needed will consider metaneb   · Duoneb   · ABX per ID   · Follow up cultures        Thank you for consultation    Electronically signed by Damaso Appiah MD, FCCP,  on 4/3/2019 at 5:27 PM

## 2019-04-03 NOTE — PLAN OF CARE
Problem: Risk for Impaired Skin Integrity  Goal: Tissue integrity - skin and mucous membranes  Description  Structural intactness and normal physiological function of skin and  mucous membranes.   Outcome: Ongoing     Problem: Falls - Risk of:  Goal: Will remain free from falls  Description  Will remain free from falls  Outcome: Ongoing  Goal: Absence of physical injury  Description  Absence of physical injury  Outcome: Ongoing

## 2019-04-03 NOTE — PROGRESS NOTES
Monitor room called saying the pt might be Vtach, EKG done - showed NSR (pt is hitting his face with a soft ball).

## 2019-04-03 NOTE — FLOWSHEET NOTE
3940  RN concerned that patient is not clearing trach. ROSAURA Wright and Sayda Chavez went to evaluate the patient. Yolis suctioned the patient out and there was thick white mucous in the trach. ROSAURA Wright alerted respiratory Malissa Celaya to come evaluate the patient because oxygen sat is saying 85% on 5L via trach. IV is also infiltrated at this time, will be replacing.

## 2019-04-03 NOTE — PROGRESS NOTES
history  ?  20 pack years  []   Pulmonary Disorder  (acute or chronic)  []   Severe or Chronic w/ Exacerbation  []     Surgical Status No [x]   Surgeries     General []   Surgery Lower []   Abdominal Thoracic or []   Upper Abdominal Thoracic with  PulmonaryDisorder  []     Chest X-ray Clear/Not  Ordered     []  Chronic Changes  Results Pending  [x]  Infiltrates, atelectasis, pleural effusion, or edema  []  Infiltrates in more than one lobe []  Infiltrate + Atelectasis, &/or pleural effusion  []    Respiratory Pattern Regular,  RR = 12-20 [x]  Increased,  RR = 21-25 []  JACKSON, irregular,  or RR = 26-30 []  Decreased FEV1  or RR = 31-35 []  Severe SOB, use  of accessory muscles, or RR ? 35  []    Mental Status Alert, oriented,  Cooperative []  Confused but Follows commands []  Lethargic or unable to follow commands [x]  Obtunded  []  Comatose  []    Breath Sounds Clear to  auscultation  []  Decreased unilaterally or  in bases only [x]  Decreased  bilaterally  []  Crackles or intermittent wheezes []  Wheezes []    Cough Strong, Spontan., & nonproductive []  Strong,  spontaneous, &  productive [x]  Weak,  Nonproductive []  Weak, productive or  with wheezes []  No spontaneous  cough or may require suctioning []    Level of Activity Ambulatory []  Ambulatory w/ Assist  [x]  Non-ambulatory []  Paraplegic []  Quadriplegic []    Total    Score:____5___     Triage Score:___5_____      Tri       Triage:     1. (>20) Freq: Q3    2. (16-20) Freq: Q4   3. (11-15) Freq: QID & Albuterol Q2 PRN    4. (6-10) Freq: TID & Albuterol Q2 PRN    5. (0-5) Freq Q4prn

## 2019-04-04 NOTE — CARE COORDINATION
Plan remains for pt to return to his [facility] group home when he is medically stable. Tube feeding to restart today.

## 2019-04-04 NOTE — PROGRESS NOTES
INPATIENT PROGRESS NOTES    PATIENT NAME: Chip Gallardo  MRN: [id number]  SERVICE DATE:  April 4, 2019   SERVICE TIME:  2:37 PM      PRIMARY SERVICE: Pulmonary Disease    CHIEF COMPLAINTS: increased tracheal secretions     INTERVAL HPI: Patient seen and examined at bedside, Interval Notes, orders reviewed. Nursing notes noted    Patient is unable to provide history, sitting in bed, no distress, secretions are thick yellow, otherwise no issues overnight. Review of system:       Unable to provide     Social History     Tobacco Use    Smoking status: Never Smoker    Smokeless tobacco: Never Used   Substance Use Topics    Alcohol use: No     History reviewed. No pertinent family history. OBJECTIVE    Body mass index is 27.08 kg/m². PHYSICAL EXAM:  Vitals:  BP (!) 101/41   Pulse 82   Temp 97.9 °F (36.6 °C) (Oral)   Resp 16   Ht 5' (1.524 m)   Wt 138 lb 10.7 oz (62.9 kg)   SpO2 93%   BMI 27.08 kg/m²     General: alert, no distress  Head: normocephalic, atraumatic  Eyes:No gross abnormalities. ENT:  MMM no lesions  Neck:  supple, no masses and tracheostomy in place bruising or tenderness  Chest : Clear to auscultation, no wheezing, minimal basal rales, nontender, tympanic  Heart[de-identified] Heart sounds are normal.  Regular rate and rhythm without murmur, gallop or rub. ABD:  symmetric, soft, non-tender  Musculoskeletal : no cyanosis, no clubbing and no edema  Neuro: More awake today, moves all 4 extremities  Skin: No rashes or nodules noted.   Lymph node:  no cervical nodes  Urology: No Painting   Psychiatric: appropriate    DATA:   Recent Labs     04/03/19  0533 04/04/19  0443   WBC 10.6 9.7   HGB 11.2* 10.9*   HCT 33.3* 32.4*   MCV 93.5 93.2    239     Recent Labs     04/02/19  1830 04/02/19  2250 04/04/19  0443    135 142   K 4.5 4.2 4.0    98 105   CO2 27 27 25   BUN 21 19 14   CREATININE 0.38* 0.32* 0.36*   GLUCOSE 92 92 85   CALCIUM 9.0 8.3* 8.3*   PROT 7.3 6.2*  --    LABALBU 3.9 3.7 3.5 BILITOT <0.2 <0.2  --    ALKPHOS 100 84  --    AST 20 24  --    ALT 17 16  --    LABGLOM >60.0 >60.0 >60.0   GFRAA >60.0 >60.0 >60.0   GLOB 3.4 2.5  --        MV Settings:     FiO2 : 35 %    No results for input(s): PHART, MGR3FCX, PO2ART, ZYL4FYA, BEART, W0OWCAYZ in the last 72 hours. O2 Device: Trach mask  O2 Flow Rate (L/min): 8 L/min    Diet NPO Effective Now     MEDICATIONS during current hospitalization:    Continuous Infusions:   sodium chloride 75 mL/hr at 04/04/19 0319       Scheduled Meds:   amikacin (AMIKIN) IVPB  7.5 mg/kg Intravenous Q24H    ipratropium-albuterol  1 ampule Inhalation Q4H WA    sodium chloride flush  10 mL Intravenous 2 times per day    enoxaparin  40 mg Subcutaneous Daily    atorvastatin  10 mg Per G Tube Daily    famotidine  20 mg Per G Tube BID    glycopyrrolate  1 mg Per G Tube BID    calcium elemental  500 mg Per G Tube BID    predniSONE  10 mg Oral Daily    sennosides-docusate sodium  2 tablet Per G Tube Daily    vitamin D  5,000 Units Per G Tube Daily    CENTRUM/CERTA-NICOLE with minerals oral  15 mL Per G Tube Daily    gabapentin  400 mg Per G Tube TID    polyethylene glycol  17 g Oral Daily    carBAMazepine  400 mg Per G Tube Daily       PRN Meds:albuterol, sodium chloride flush, acetaminophen, bisacodyl    Radiology  Xr Chest Portable    Result Date: 4/3/2019  EXAMINATION: Portable AP ERECT view of the chest. CLINICAL HISTORY:  Cough , acute on chronic respiratory failure with hypoxia. Cerebral palsy. DATE: 4/2/2019 7:45 PM COMPARISONS: 4/1/2019 FINDINGS: The heart is mildly enlarged, unchanged. Tracheostomy tube is in satisfactory position. Reticular nodular infiltrate left lung base unchanged. Mild reticular nodular infiltrate right base. No pleural effusion or pneumothorax. Levoscoliosis thoracic spine. RETICULAR NODULAR INFILTRATES LUNG BASES, LEFT GREATER THAN RIGHT, SLIGHTLY WORSE IN RIGHT BASE.      Xr Chest Portable    Result Date: 4/1/2019  COMPARISON: February 21, 2019; February 24, 2019. HISTORY: recent pneumonia TECHNIQUE: AP view FINDINGS: Opacity is seen in the left lower lobe. The reticulonodular opacities previously seen in the right lung field have significantly diminished. . The cardiac silhouette is within normal limits. A tracheostomy tube is seen in place. There is a levoscoliosis seen in the thoracic spine. Osteopenia is again visualized. Healed fracture of the shaft of the right femur is visualized. Mild airspace disease/infiltrate is again seen in the left lower lobe that does not appear significantly changed.             IMPRESSION AND SUGGESTION:  Patient is at risk due to   · Pseudomonas tracheobronchitis  · Chronic respiratory failure on tracheostomy  · Dysphagia status post PEG tube  · History of PE    Recommendations  · Continue antibiotics per ID  · Continue pulmonary hygiene  · DC IV fluid  · Start tube feed  · Avoid volume overload  · Continue current meds      JOSE KAPLAN     Electronically signed by Damaso Appiah MD,  FCCP   on 4/4/2019 at 2:37 PM

## 2019-04-04 NOTE — PROGRESS NOTES
Nutrition Assessment    Type and Reason for Visit: Initial, Consult(Home TF, [place])    Nutrition Recommendations: Continue NPO, Start Tube Feeding(Recommend Jevity 1.5 @ 40ml/hr to provide ~1440kcals/61 grams protein per day with 150mls QID. (with d/c of IVF))    Nutrition Assessment: Pt appears adequately nourished on admission evidenced by EN provided at [facility] and noted weight gain. Recommend start PEG feeding for nutritional support. Malnutrition Assessment:  · Malnutrition Status: No malnutrition  · Context: Chronic illness  · Findings of the 6 clinical characteristics of malnutrition (Minimum of 2 out of 6 clinical characteristics is required to make the diagnosis of moderate or severe Protein Calorie Malnutrition based on AND/ASPEN Guidelines):  1. Energy Intake-(greater than 75% per NH), Greater than or equal to 1 month    2. Weight Loss-No significant weight loss,    3. Fat Loss-No significant subcutaneous fat loss,    4. Muscle Loss-No significant muscle mass loss,    5. Fluid Accumulation-Mild fluid accumulation, Generalized  6.  Strength-Not measured    Nutrition Risk Level: High    Nutrient Needs:  · Estimated Daily Total Kcal: 8095-8621 (kg x 22-25)  · Estimated Daily Protein (g): 58-67 (kg x 1.2-1.4)  · Estimated Daily Total Fluid (ml/day): ~1500 (1ml/kcal)    Nutrition Diagnosis:   · Problem: Swallowing difficulty  · Etiology: related to Cognitive or neurological impairment     Signs and symptoms:  as evidenced by NPO status due to medical condition, Nutrition support - EN    Objective Information:  · Nutrition-Focused Physical Findings: + Gen edema noted per nsg. IVF infusing at 75ml/hr. Hx-MR, CP. Pt has PEG. Pt tolerating continuous TF per NH.   · Wound Type: (reddened sacrum noted)  · Current Nutrition Therapies:  · Oral Diet Orders: NPO   · Anthropometric Measures:  · Ht: 5' (152.4 cm)   · Admission Body Wt: 138 lb 11.2 oz (62.9 kg)(bedscale)  · Usual Body Wt: 128 lb 8 oz (58.3 kg)(1/2019 EMR; 128.5lb 1/2019 per NH)  · % Weight Change:  ,  weight gain noted  · Ideal Body Wt: 106 lb (48.1 kg), % Ideal Body >100%  · BMI Classification: BMI 25.0 - 29.9 Overweight    Nutrition Interventions:   Continue NPO, Start Tube Feeding(Recommend Jevity 1.5 @ 40ml/hr to provide ~1440kcals/61 grams protein per day with 150mls QID. (with d/c of IVF))  Continued Inpatient Monitoring    Nutrition Evaluation:   · Evaluation: Goals set   · Goals: EN to meet estimated nutritional needs. Stable weight.     · Monitoring: TF Intake, TF Tolerance, Weight, Pertinent Labs      Electronically signed by Johnny Haywood RD, EDWIN on 4/4/19 at 2:17 PM

## 2019-04-04 NOTE — PLAN OF CARE
Nutrition Problem: Swallowing difficulty  Intervention: Food and/or Nutrient Delivery: Continue NPO, Start Tube Feeding(Recommend Jevity 1.5 @ 40ml/hr with 150mls QID. (with d/c of IVF))  Nutritional Goals: EN to meet estimated nutritional needs. Stable weight.

## 2019-04-04 NOTE — PROGRESS NOTES
Hospitalist Progress Note      PCP: Andrei Mireles MD    Date of Admission: 4/2/2019    Chief Complaint: no acute events,afebrile,stable HD    Medications:  Reviewed    Infusion Medications    sodium chloride 75 mL/hr at 04/04/19 0319     Scheduled Medications    amikacin (AMIKIN) IVPB  7.5 mg/kg Intravenous Q24H    ipratropium-albuterol  1 ampule Inhalation Q4H WA    sodium chloride flush  10 mL Intravenous 2 times per day    enoxaparin  40 mg Subcutaneous Daily    atorvastatin  10 mg Per G Tube Daily    famotidine  20 mg Per G Tube BID    glycopyrrolate  1 mg Per G Tube BID    calcium elemental  500 mg Per G Tube BID    predniSONE  10 mg Oral Daily    sennosides-docusate sodium  2 tablet Per G Tube Daily    vitamin D  5,000 Units Per G Tube Daily    CENTRUM/CERTA-NICOLE with minerals oral  15 mL Per G Tube Daily    gabapentin  400 mg Per G Tube TID    polyethylene glycol  17 g Oral Daily    carBAMazepine  400 mg Per G Tube Daily     PRN Meds: albuterol, sodium chloride flush, acetaminophen, bisacodyl      Intake/Output Summary (Last 24 hours) at 4/4/2019 1125  Last data filed at 4/4/2019 0610  Gross per 24 hour   Intake --   Output 1600 ml   Net -1600 ml       Exam:    BP (!) 101/41   Pulse 68   Temp 97.9 °F (36.6 °C) (Oral)   Resp 16   Ht 5' (1.524 m)   Wt 138 lb 10.7 oz (62.9 kg)   SpO2 100%   BMI 27.08 kg/m²     General appearance: chronically ill -appearing man, mild distress,Trach present. Respiratory:  Bronchial sounds, diminished BS bilaterally . Cardiovascular: Regular rate and rhythm with normal S1/S2.   Abdomen: Soft, non-tender, non-distended, PEG tube present, jennings present  Musculoskeletal: LE contracted , no edema     Labs:   Recent Labs     04/02/19  1830 04/03/19  0533 04/04/19  0443   WBC 11.5* 10.6 9.7   HGB 12.9* 11.2* 10.9*   HCT 36.8* 33.3* 32.4*    241 239     Recent Labs     04/02/19  1830 04/02/19  2250 04/04/19  0443    135 142   K 4.5 4.2 4.0    98 105   CO2 27 27 25   BUN 21 19 14   CREATININE 0.38* 0.32* 0.36*   CALCIUM 9.0 8.3* 8.3*   PHOS  --   --  2.3     Recent Labs     04/02/19  1830 04/02/19  2250   AST 20 24   ALT 17 16   BILITOT <0.2 <0.2   ALKPHOS 100 84     No results for input(s): INR in the last 72 hours. Recent Labs     04/02/19  1830   TROPONINI <0.010       Urinalysis:      Lab Results   Component Value Date    NITRU POSITIVE 04/02/2019    WBCUA >100 04/02/2019    BACTERIA FEW 04/02/2019    RBCUA 10-20 04/02/2019    BLOODU TRACE 04/02/2019    SPECGRAV 1.015 04/02/2019    GLUCOSEU Negative 04/02/2019       Radiology:  XR CHEST PORTABLE   Final Result   RETICULAR NODULAR INFILTRATES LUNG BASES, LEFT GREATER THAN RIGHT, SLIGHTLY WORSE IN RIGHT BASE. Assessment/Plan:    68 y.o. male with history of intellectual disability, cerebral palsy, s/p PEG and tracheostomy placement, chronic Painting catheter, and past history of sepsis who presented with: P. Aeruginosa UTI   - likely related to chronic Painting catheter(POA)  - repeat u/c positive for GNB  - blood cultures no growth  - on IV amikacin  - ID consulted    Possible tracheobronchitis  - sputum culture positive for GNB  - continue Duonebs  - pulmonary service following    Dysphagia s/p PEG  - nutrition service consulted to resume nutrition via PEG tube            Electronically signed by Tristan Julien MD on 4/4/2019 at 11:25 AM

## 2019-04-04 NOTE — PROGRESS NOTES
with hypoxia ([address]     Pneumonia 02/20/2019    Disorder of intellectual development 02/17/2019    Gastroesophageal reflux disease without esophagitis 02/17/2019    Cerebral palsy ([address] 02/17/2019    Mixed hyperlipidemia 02/17/2019    Vitamin D deficiency 02/17/2019    Sensorineural hearing loss (SNHL) of both ears 02/17/2019    Infantile idiopathic scoliosis of thoracolumbar region 02/17/2019    Age-related osteoporosis without current pathological fracture 02/17/2019    Status post insertion of percutaneous endoscopic gastrostomy (PEG) tube ([address] 02/17/2019    Status post tracheostomy [facility] [facility] 02/17/2019    Status post closed fracture of left femur 02/17/2019    Slow transit constipation 02/17/2019    Personal history of PE (pulmonary embolism) 02/17/2019    History of DVT of lower extremity 02/17/2019    Oropharyngeal dysphagia 02/17/2019       BP (!) 101/41   Pulse 68   Temp 97.9 °F (36.6 °C) (Oral)   Resp 16   Ht 5' (1.524 m)   Wt 138 lb 10.7 oz (62.9 kg)   SpO2 100%   BMI 27.08 kg/m²     Review of Systems   Unable to perform ROS: Patient nonverbal       Physical Exam   HENT:   Head: Normocephalic. Cardiovascular: Normal heart sounds. No murmur heard. Pulmonary/Chest: No respiratory distress. He has no wheezes. He has no rales. Abdominal: Soft. He exhibits no distension and no mass. There is no tenderness. There is no rebound and no guarding. Neurological: He is alert. Skin: Skin is warm. No rash noted. No erythema.        Assessment/Plan:  Urinary tract infection given urinalysis     Sputum is colonized because patient has trach growth is not significant         continue amikacin at this time to cover pseudomonas in urine

## 2019-04-05 NOTE — PROGRESS NOTES
Suzanne Tinsley is a 68 y.o.male patient. Urinary tract infection          Urine Culture [[id number]] (Abnormal) Collected: 04/02/19 2000   Order Status: Completed Specimen: Urine, clean catch Updated: 04/05/19 0850    Urine Culture, Routine --    Organism Pseudomonas aeruginosaAbnormal     Urine Culture, Routine --    >100,000 CFU/ml   Sensitivity to follow      CULTURE, RESPIRATORY Abnormal  04/02/2019  8:38 PM [address] [facility]   Usual respiratory raul in 48 hrs with    Gram Stain Result 04/02/2019  8:38 PM [address] [facility]   Few WBC's   No epithelial cells   Many Small Gram positive rods   Moderate Gram negative rods    Organism Abnormal  04/02/2019  8:38 PM [address] [facility]   Serratia marcescens    CULTURE, RESPIRATORY 04/02/2019  8:38 PM [address] [facility]   Heavy growth    Testing Performed By     Lab - Abbreviation Name Director Address Valid Date Range   [address] [address] [facility] Silvana Franklin MD [address] [place] [address] 07/12/18 0959-Present   Narrative   Performed by: [facility]   ORDER#: [id number]                          ORDERED BY: CARLOTTA Hernández  SOURCE: Sputum Suctioned                   COLLECTED:  04/02/19 20:38  ANTIBIOTICS AT [facility]. :                      RECEIVED :  04/02/19 20:38   Susceptibility     Serratia marcescens (1)     Antibiotic Interpretation CRISTINA Status    amoxicillin-clavulanate Resistant >=32 mcg/mL     cefepime Sensitive <=1 mcg/mL     cefTRIAXone Sensitive <=1 mcg/mL     ciprofloxacin Sensitive 0.5 mcg/mL     gentamicin Sensitive <=1 mcg/mL     trimethoprim-sulfamethoxazole Sensitive <=20 mcg/mL                   Allergies   Allergen Reactions    Ciprofloxacin Hcl      Possible drug rash   unknown    Zosyn [Piperacillin Sod-Tazobactam So]      Unknown reaction     Patient Active Problem List    Diagnosis Date Noted    Acute tracheitis without airway obstruction     Pseudomonas urinary tract infection 04/02/2019    S[facility]is [facility] [facility]     Acute on chronic respiratory failure with hypoxia (HCC)     Pneumonia 02/20/2019    Disorder of intellectual development 02/17/2019    Gastroesophageal reflux disease without esophagitis 02/17/2019    Cerebral palsy ([facility] 02/17/2019    Mixed hyperlipidemia 02/17/2019    Vitamin D deficiency 02/17/2019    Sensorineural hearing loss (SNHL) of both ears 02/17/2019    Infantile idiopathic scoliosis of thoracolumbar region 02/17/2019    Age-related osteoporosis without current pathological fracture 02/17/2019    Status post insertion of percutaneous endoscopic gastrostomy (PEG) tube ([facility] 02/17/2019    Status post tracheostomy ([facility] 02/17/2019    Status post closed fracture of left femur 02/17/2019    Slow transit constipation 02/17/2019    Personal history of PE (pulmonary embolism) 02/17/2019    History of DVT of lower extremity 02/17/2019    Oropharyngeal dysphagia 02/17/2019       BP (!) 102/48   Pulse 86   Temp 97.16 °F (36.2 °C)   Resp 16   Ht 5' (1.524 m)   Wt 138 lb 10.7 oz (62.9 kg)   SpO2 100%   BMI 27.08 kg/m²     Review of Systems   Unable to perform ROS: Patient nonverbal       Physical Exam   HENT:   Head: Normocephalic. Cardiovascular: Normal heart sounds. No murmur heard. Pulmonary/Chest: No respiratory distress. He has no wheezes. He has no rales. Abdominal: Soft. He exhibits no distension and no mass. There is no tenderness. There is no rebound and no guarding. Neurological: He is alert. Skin: Skin is warm. No rash noted. No erythema.        Assessment/Plan:  Urinary tract infection given urinalysis      continue amikacin at this time to cover pseudomonas in urine

## 2019-04-05 NOTE — PROGRESS NOTES
0700 to 1900  Uneventful shift. Sunctioned x 4 for moderated amount creamy secretions. Trach care completed. Repositioned as patient allows. He sits in the bed no matter which way he is positioned. Incontinent of stool x 1. Gtube cloggs easily, and collaps when pulled back on syringe.   Electronically signed by Mary Lou Carlos RN on 4/5/2019 at 6:56 PM

## 2019-04-05 NOTE — CARE COORDINATION
DIANNEW spoke to the RN Storm Infante at [facility], she stated supervisors are not available to Sat after 11am to make any DC decisions for Pt. She did state Pt does have a history of [facility]. DIANNEW called sister and she stated Pt just got home from [place] a 1 1/2WEEKS ago. Sister stated she wants to talk to Dr SHELTON [facility] and if he needs to go back for IVs and care it is ok. Victorina Bhatt Electronically signed by DOMINIK Witt on 4/5/2019 at 12:35 PM

## 2019-04-05 NOTE — PROGRESS NOTES
98 105 103   CO2 27 25 26   BUN 19 14 13   CREATININE 0.32* 0.36* 0.34*   CALCIUM 8.3* 8.3* 8.4*   PHOS  --  2.3 2.4     Recent Labs     04/02/19  1830 04/02/19  2250   AST 20 24   ALT 17 16   BILITOT <0.2 <0.2   ALKPHOS 100 84     No results for input(s): INR in the last 72 hours. Recent Labs     04/02/19  1830   TROPONINI <0.010       Urinalysis:      Lab Results   Component Value Date    NITRU POSITIVE 04/02/2019    WBCUA >100 04/02/2019    BACTERIA FEW 04/02/2019    RBCUA 10-20 04/02/2019    BLOODU TRACE 04/02/2019    SPECGRAV 1.015 04/02/2019    GLUCOSEU Negative 04/02/2019       Radiology:  XR CHEST PORTABLE   Final Result   RETICULAR NODULAR INFILTRATES LUNG BASES, LEFT GREATER THAN RIGHT, SLIGHTLY WORSE IN RIGHT BASE. Assessment/Plan:    68 y.o. male with history of intellectual disability, cerebral palsy, s/p PEG and tracheostomy placement, chronic Painting catheter, and past history of sepsis who presented with: P. Aeruginosa UTI   - likely related to chronic Painting catheter(POA)  - repeat u/c positive for Pseudomonas  - blood cultures no growth  - on IV amikacin  - ID following    Possible tracheobronchitis  - sputum culture positive for Serratia marcesens  - continue Duonebs  - pulmonary service following    Dysphagia s/p PEG  - nutrition service consulted to resume nutrition via PEG tube    Disposition - back to his group home        Electronically signed by Hector Diaz MD on 4/5/2019 at 11:51 AM

## 2019-04-05 NOTE — PROGRESS NOTES
INPATIENT PROGRESS NOTES    PATIENT NAME: Clay Dolan  MRN: [id number]  SERVICE DATE:  April 5, 2019   SERVICE TIME:  12:39 PM      PRIMARY SERVICE: Pulmonary Disease    CHIEF COMPLAINTS: increased tracheal secretions     INTERVAL HPI: Patient seen and examined at bedside, Interval Notes, orders reviewed. Nursing notes noted    Patient sitting in bed, does not answer questions, no distress, still with high tracheal secretions, yellow and thick, no increased O2 requirement, no fever, no nausea no vomiting, tolerating tube feed. Review of system:       Unable to provide     Social History     Tobacco Use    Smoking status: Never Smoker    Smokeless tobacco: Never Used   Substance Use Topics    Alcohol use: No     History reviewed. No pertinent family history. OBJECTIVE    Body mass index is 27.08 kg/m². PHYSICAL EXAM:  Vitals:  BP (!) 102/59   Pulse 84   Temp 98.2 °F (36.8 °C) (Oral)   Resp 16   Ht 5' (1.524 m)   Wt 138 lb 10.7 oz (62.9 kg)   SpO2 96%   BMI 27.08 kg/m²     General: alert, no distress  Head: normocephalic, atraumatic  Eyes:No gross abnormalities. ENT:  MMM no lesions  Neck:  supple, no masses and tracheostomy in place bruising or tenderness  Chest : Diminished air movement, no wheezing, no rales, nontender, tympanic  Heart[de-identified] Heart sounds are normal.  Regular rate and rhythm without murmur, gallop or rub. ABD:  symmetric, soft, non-tender  Musculoskeletal : no cyanosis, no clubbing and no edema  Neuro: More awake today, moves all 4 extremities  Skin: No rashes or nodules noted.   Lymph node:  no cervical nodes  Urology: No Painting   Psychiatric: appropriate    DATA:   Recent Labs     04/04/19  0443 04/05/19  0520   WBC 9.7 11.1*   HGB 10.9* 11.8*   HCT 32.4* 35.0*   MCV 93.2 93.4    278     Recent Labs     04/02/19  1830 04/02/19  2250 04/04/19  0443 04/05/19  0520    135 142 141   K 4.5 4.2 4.0 3.9    98 105 103   CO2 27 27 25 26   BUN 21 19 14 13   CREATININE 0.38* 0.32* 0.36* 0.34*   GLUCOSE 92 92 85 130*   CALCIUM 9.0 8.3* 8.3* 8.4*   PROT 7.3 6.2*  --   --    LABALBU 3.9 3.7 3.5 3.7   BILITOT <0.2 <0.2  --   --    ALKPHOS 100 84  --   --    AST 20 24  --   --    ALT 17 16  --   --    LABGLOM >60.0 >60.0 >60.0 >60.0   GFRAA >60.0 >60.0 >60.0 >60.0   GLOB 3.4 2.5  --   --        MV Settings:     FiO2 : 35 %    No results for input(s): PHART, RQM1YAJ, PO2ART, UUF6MJB, BEART, J5YAWVXC in the last 72 hours. O2 Device: Trach mask  O2 Flow Rate (L/min): 7 L/min    DIET TUBE FEED CONTINUOUS/CYCLIC NPO; 1.5 Calorie with Fiber; Gastrostomy; 20; 40     MEDICATIONS during current hospitalization:    Continuous Infusions:      Scheduled Meds:   amikacin (AMIKIN) IVPB  7.5 mg/kg Intravenous Q24H    ipratropium-albuterol  1 ampule Inhalation Q4H WA    sodium chloride flush  10 mL Intravenous 2 times per day    enoxaparin  40 mg Subcutaneous Daily    atorvastatin  10 mg Per G Tube Daily    famotidine  20 mg Per G Tube BID    glycopyrrolate  1 mg Per G Tube BID    calcium elemental  500 mg Per G Tube BID    predniSONE  10 mg Oral Daily    sennosides-docusate sodium  2 tablet Per G Tube Daily    vitamin D  5,000 Units Per G Tube Daily    CENTRUM/CERTA-NICOLE with minerals oral  15 mL Per G Tube Daily    gabapentin  400 mg Per G Tube TID    polyethylene glycol  17 g Oral Daily    carBAMazepine  400 mg Per G Tube Daily       PRN Meds:albuterol, sodium chloride flush, acetaminophen, bisacodyl    Radiology  Xr Chest Portable    Result Date: 4/3/2019  EXAMINATION: Portable AP ERECT view of the chest. CLINICAL HISTORY:  Cough , acute on chronic respiratory failure with hypoxia. Cerebral palsy. DATE: 4/2/2019 7:45 PM COMPARISONS: 4/1/2019 FINDINGS: The heart is mildly enlarged, unchanged. Tracheostomy tube is in satisfactory position. Reticular nodular infiltrate left lung base unchanged. Mild reticular nodular infiltrate right base. No pleural effusion or pneumothorax. Levoscoliosis thoracic spine. RETICULAR NODULAR INFILTRATES LUNG BASES, LEFT GREATER THAN RIGHT, SLIGHTLY WORSE IN RIGHT BASE. Xr Chest Portable    Result Date: 4/1/2019  COMPARISON: February 21, 2019; February 24, 2019. HISTORY: recent pneumonia TECHNIQUE: AP view FINDINGS: Opacity is seen in the left lower lobe. The reticulonodular opacities previously seen in the right lung field have significantly diminished. . The cardiac silhouette is within normal limits. A tracheostomy tube is seen in place. There is a levoscoliosis seen in the thoracic spine. Osteopenia is again visualized. Healed fracture of the shaft of the right femur is visualized. Mild airspace disease/infiltrate is again seen in the left lower lobe that does not appear significantly changed.             IMPRESSION AND SUGGESTION:  Patient is at risk due to   · Pseudomonas tracheobronchitis, polymicrobial  · Chronic respiratory failure on tracheostomy  · Dysphagia status post PEG tube  · History of PE    Recommendations  · Continue antibiotics per ID  · Continue pulmonary hygiene  · Continue tube feed  · Avoid volume overload  · Continue current meds      DW Dr Bryson Tellez     Electronically signed by Tejinder Odom MD,  FCCP   on 4/5/2019 at 12:39 PM

## 2019-04-06 NOTE — PROGRESS NOTES
Unable to flush peg tube and unable to pull back. Tube flattens when attempting to pull. Will notify Dr. Olive Florentino:  Notified Betsy Escalante about peg tube difficulties. Orders received. Pt has no signs of distress at this time. Assisted with positioning in bed. Frequent monitoring. 0000:  Pt suctioned for thick white sputum. Tolerated well. Pt with no signs of distress.

## 2019-04-06 NOTE — PROGRESS NOTES
Hospitalist Progress Note      PCP: Lorenza Tristan MD    Date of Admission: 4/2/2019    Chief Complaint: no acute events,afebrile,stable HD,requires frequent suctioning per nursing report    Medications:  Reviewed    Infusion Medications     Scheduled Medications    ipratropium-albuterol  1 ampule Inhalation TID    amikacin (AMIKIN) IVPB  7.5 mg/kg Intravenous Q24H    sodium chloride flush  10 mL Intravenous 2 times per day    enoxaparin  40 mg Subcutaneous Daily    atorvastatin  10 mg Per G Tube Daily    famotidine  20 mg Per G Tube BID    glycopyrrolate  1 mg Per G Tube BID    calcium elemental  500 mg Per G Tube BID    predniSONE  10 mg Oral Daily    sennosides-docusate sodium  2 tablet Per G Tube Daily    vitamin D  5,000 Units Per G Tube Daily    CENTRUM/CERTA-NICOLE with minerals oral  15 mL Per G Tube Daily    gabapentin  400 mg Per G Tube TID    polyethylene glycol  17 g Oral Daily    carBAMazepine  400 mg Per G Tube Daily     PRN Meds: albuterol, sodium chloride flush, acetaminophen, bisacodyl      Intake/Output Summary (Last 24 hours) at 4/6/2019 1132  Last data filed at 4/6/2019 0815  Gross per 24 hour   Intake 1710 ml   Output 850 ml   Net 860 ml       Exam:    BP (!) 97/54   Pulse 62   Temp 97.9 °F (36.6 °C)   Resp 18   Ht 5' (1.524 m)   Wt 138 lb 10.7 oz (62.9 kg)   SpO2 91%   BMI 27.08 kg/m²     General appearance: chronically ill -appearing man, mild distress,Trach present. Respiratory:  Bronchial sounds, diminished BS bilaterally . Cardiovascular: Regular rate and rhythm with normal S1/S2.   Abdomen: Soft, non-tender, non-distended, PEG tube present, jennings present  Musculoskeletal: LE contracted , no edema     Labs:   Recent Labs     04/04/19  0443 04/05/19  0520 04/06/19  0529   WBC 9.7 11.1* 8.5   HGB 10.9* 11.8* 11.0*   HCT 32.4* 35.0* 32.8*    278 238     Recent Labs     04/04/19  0443 04/05/19  0520 04/06/19  0529    141 144   K 4.0 3.9 3.9    103 105 CO2 25 26 27   BUN 14 13 14   CREATININE 0.36* 0.34* 0.34*   CALCIUM 8.3* 8.4* 8.7   PHOS 2.3 2.4 2.5     No results for input(s): AST, ALT, BILIDIR, BILITOT, ALKPHOS in the last 72 hours. No results for input(s): INR in the last 72 hours. No results for input(s): Eyvonne Ape in the last 72 hours. Urinalysis:      Lab Results   Component Value Date    NITRU POSITIVE 04/02/2019    WBCUA >100 04/02/2019    BACTERIA FEW 04/02/2019    RBCUA 10-20 04/02/2019    BLOODU TRACE 04/02/2019    SPECGRAV 1.015 04/02/2019    GLUCOSEU Negative 04/02/2019       Radiology:  XR CHEST PORTABLE   Final Result   RETICULAR NODULAR INFILTRATES LUNG BASES, LEFT GREATER THAN RIGHT, SLIGHTLY WORSE IN RIGHT BASE. Assessment/Plan:    68 y.o. male with history of intellectual disability, cerebral palsy, s/p PEG and tracheostomy placement, chronic Painting catheter, and past history of sepsis who presented with:    MDR P. Aeruginosa UTI   - likely related to chronic Painting catheter(POA)  - repeat u/c positive for MDR Pseudomonas  - blood cultures no growth  - on IV amikacin  - ID following    Possible tracheobronchitis  - sputum culture positive for Serratia marcesens  - continue Duonebs  - pulmonary service following    Dysphagia s/p PEG  - nutrition service consulted to resume nutrition via PEG tube    [facility]        Electronically signed by Nay Trinh MD on 4/6/2019 at 11:32 AM

## 2019-04-06 NOTE — PROGRESS NOTES
[facility] AT [place] Respiratory Therapy Evaluation   Current Order:  Richard Stover      Home Regimen: TID      Ordering Physician: Roland Rachel  Re-evaluation Date:  4/9     Diagnosis: PSEUDOMONAS      Patient Status: Stable     The following MDI Criteria must be met in order to convert aerosol to MDI with spacer. If unable to meet, MDI will be converted to aerosol:  []  Patient able to demonstrate the ability to use MDI effectively  []  Patient alert and cooperative  []  Patient able to take deep breath with 5-10 second hold  []  Medication(s) available in this delivery method   []  Peak flow greater than or equal to 200 ml/min            Current Order Substituted To  (same drug, same frequency)   Aerosol to MDI [] Albuterol Sulfate 0.083% unit dose by aerosol Albuterol Sulfate MDI 2 puffs by inhalation with spacer    [] Levalbuterol 1.25 mg unit dose by aerosol Levalbuterol MDI 2 puffs by inhalation with spacer    [] Levalbuterol 0.63 mg unit dose by aerosol Levalbuterol MDI 2 puffs by inhalation with spacer    [] Ipratropium Bromide 0.02% unit dose by aerosol Ipratropium Bromide MDI 2 puffs by inhalation with spacer    [] Duoneb (Ipratropium + Albuterol) unit dose by aerosol Ipratropium MDI + Albuterol MDI 2 puffs by inhalation w/spacer   MDI to Aerosol [] Albuterol Sulfate MDI Albuterol Sulfate 0.083% unit dose by aerosol    [] Levalbuterol MDI 2 puffs by inhalation Levalbuterol 1.25 mg unit dose by aerosol    [] Ipratropium Bromide MDI by inhalation Ipratropium Bromide 0.02% unit dose by aerosol    [] Combivent (Ipratropium + Albuterol) MDI by inhalation Duoneb (Ipratropium + Albuterol) unit dose by aerosol   Treatment Assessment [Frequency/Schedule]:  Change frequency to: _____DUONEB TID _____________________________________________per Protocol, P&T, [facility]      Points 0 1 2 3 4   Pulmonary Status  Non-Smoker  [x]   Smoking history   < 20 pack years  []   Smoking history  ?  20 pack years  []   Pulmonary Disorder  (acute or chronic)  []   Severe or Chronic w/ Exacerbation  []     Surgical Status No [x]   Surgeries     General []   Surgery Lower []   Abdominal Thoracic or []   Upper Abdominal Thoracic with  PulmonaryDisorder  []     Chest X-ray Clear/Not  Ordered     []  Chronic Changes  Results Pending  []  Infiltrates, atelectasis, pleural effusion, or edema  []  Infiltrates in more than one lobe [x]  Infiltrate + Atelectasis, &/or pleural effusion  []    Respiratory Pattern Regular,  RR = 12-20 [x]  Increased,  RR = 21-25 []  JACKSON, irregular,  or RR = 26-30 []  Decreased FEV1  or RR = 31-35 []  Severe SOB, use  of accessory muscles, or RR ? 35  []    Mental Status Alert, oriented,  Cooperative []  Confused but Follows commands []  Lethargic or unable to follow commands [x]  Obtunded  []  Comatose  []    Breath Sounds Clear to  auscultation  []  Decreased unilaterally or  in bases only []  Decreased  bilaterally  [x]  Crackles or intermittent wheezes []  Wheezes []    Cough Strong, Spontan., & nonproductive []  Strong,  spontaneous, &  productive [x]  Weak,  Nonproductive []  Weak, productive or  with wheezes []  No spontaneous  cough or may require suctioning []    Level of Activity Ambulatory []  Ambulatory w/ Assist  [x]  Non-ambulatory []  Paraplegic []  Quadriplegic []    Total    Score:____9___     Triage Score:__4_____      Tri       Triage:     1. (>20) Freq: Q3    2. (16-20) Freq: Q4   3. (11-15) Freq: QID & Albuterol Q2 PRN    4. (6-10) Freq: TID & Albuterol Q2 PRN    5. (0-5) Freq Q4prn

## 2019-04-06 NOTE — PROGRESS NOTES
INPATIENT PROGRESS NOTES    PATIENT NAME: Josue Cheng  MRN: [id number]  SERVICE DATE:  April 6, 2019   SERVICE TIME:  1:46 PM      PRIMARY SERVICE: Pulmonary Disease    CHIEF COMPLAINTS: Tracheobronchitis     INTERVAL HPI: Patient seen and examined at bedside, Interval Notes, orders reviewed. Nursing notes noted    Secretions less, no fever , no issues over night , more responsive     Review of system:       Unable to provide     Social History     Tobacco Use    Smoking status: Never Smoker    Smokeless tobacco: Never Used   Substance Use Topics    Alcohol use: No     History reviewed. No pertinent family history. OBJECTIVE    Body mass index is 27.08 kg/m². PHYSICAL EXAM:  Vitals:  BP (!) 97/54   Pulse 62   Temp 97.9 °F (36.6 °C)   Resp 18   Ht 5' (1.524 m)   Wt 138 lb 10.7 oz (62.9 kg)   SpO2 91%   BMI 27.08 kg/m²     General: alert, no distress  Head: normocephalic, atraumatic  Eyes:No gross abnormalities. ENT:  MMM no lesions  Neck:  supple, no masses and tracheostomy in place bruising or tenderness  Chest :  Improved air movement, no wheezing, no rales, nontender, tympanic  Heart[de-identified] Heart sounds are normal.  Regular rate and rhythm without murmur, gallop or rub. ABD:  symmetric, soft, non-tender  Musculoskeletal : no cyanosis, no clubbing and no edema  Neuro: More awake today, moves all 4 extremities  Skin: No rashes or nodules noted.   Lymph node:  no cervical nodes  Urology: No Painting   Psychiatric: appropriate    DATA:   Recent Labs     04/05/19  0520 04/06/19  0529   WBC 11.1* 8.5   HGB 11.8* 11.0*   HCT 35.0* 32.8*   MCV 93.4 93.2    238     Recent Labs     04/05/19  0520 04/06/19  0529    144   K 3.9 3.9    105   CO2 26 27   BUN 13 14   CREATININE 0.34* 0.34*   GLUCOSE 130* 127*   CALCIUM 8.4* 8.7   LABALBU 3.7 3.5   LABGLOM >60.0 >60.0   GFRAA >60.0 >60.0       MV Settings:     FiO2 : 35 %    No results for input(s): PHART, QYC0JNN, PO2ART, KJN0AGW, BEART, B3LVKWOK in the last 72 hours. O2 Device: Trach mask  O2 Flow Rate (L/min): 7 L/min    DIET TUBE FEED CONTINUOUS/CYCLIC NPO; 1.5 Calorie with Fiber; Gastrostomy; 20; 40     MEDICATIONS during current hospitalization:    Continuous Infusions:      Scheduled Meds:   ipratropium-albuterol  1 ampule Inhalation TID    amikacin (AMIKIN) IVPB  7.5 mg/kg Intravenous Q24H    sodium chloride flush  10 mL Intravenous 2 times per day    enoxaparin  40 mg Subcutaneous Daily    atorvastatin  10 mg Per G Tube Daily    famotidine  20 mg Per G Tube BID    glycopyrrolate  1 mg Per G Tube BID    calcium elemental  500 mg Per G Tube BID    predniSONE  10 mg Oral Daily    sennosides-docusate sodium  2 tablet Per G Tube Daily    vitamin D  5,000 Units Per G Tube Daily    CENTRUM/CERTA-NICOLE with minerals oral  15 mL Per G Tube Daily    gabapentin  400 mg Per G Tube TID    polyethylene glycol  17 g Oral Daily    carBAMazepine  400 mg Per G Tube Daily       PRN Meds:albuterol, sodium chloride flush, acetaminophen, bisacodyl    Radiology  Xr Chest Portable    Result Date: 4/3/2019  EXAMINATION: Portable AP ERECT view of the chest. CLINICAL HISTORY:  Cough , acute on chronic respiratory failure with hypoxia. Cerebral palsy. DATE: 4/2/2019 7:45 PM COMPARISONS: 4/1/2019 FINDINGS: The heart is mildly enlarged, unchanged. Tracheostomy tube is in satisfactory position. Reticular nodular infiltrate left lung base unchanged. Mild reticular nodular infiltrate right base. No pleural effusion or pneumothorax. Levoscoliosis thoracic spine. RETICULAR NODULAR INFILTRATES LUNG BASES, LEFT GREATER THAN RIGHT, SLIGHTLY WORSE IN RIGHT BASE. Xr Chest Portable    Result Date: 4/1/2019  COMPARISON: February 21, 2019; February 24, 2019. HISTORY: recent pneumonia TECHNIQUE: AP view FINDINGS: Opacity is seen in the left lower lobe. The reticulonodular opacities previously seen in the right lung field have significantly diminished. . The cardiac

## 2019-04-07 NOTE — PROGRESS NOTES
INPATIENT PROGRESS NOTES    PATIENT NAME: Eren Deras  MRN: [id number]  SERVICE DATE:  April 7, 2019   SERVICE TIME:  1:15 PM      PRIMARY SERVICE: Pulmonary Disease    CHIEF COMPLAINTS: Tracheobronchitis     INTERVAL HPI: Patient seen and examined at bedside, Interval Notes, orders reviewed. Nursing notes noted    More interactive today, shakes hand, no distress, still with thick secretions, no pain, no reported fever     Review of system:       Unable to provide     Social History     Tobacco Use    Smoking status: Never Smoker    Smokeless tobacco: Never Used   Substance Use Topics    Alcohol use: No     History reviewed. No pertinent family history. OBJECTIVE    Body mass index is 27.08 kg/m². PHYSICAL EXAM:  Vitals:  BP (!) 97/54   Pulse 62   Temp 97.9 °F (36.6 °C)   Resp 20   Ht 5' (1.524 m)   Wt 138 lb 10.7 oz (62.9 kg)   SpO2 98%   BMI 27.08 kg/m²     General: alert, no distress  Head: normocephalic, atraumatic  Eyes:No gross abnormalities. ENT:  MMM no lesions  Neck:  supple, no masses and tracheostomy in place no bruising or tenderness  Chest :  Good air movement, no wheezing, nontender, tympanic,  Heart[de-identified] Heart sounds are normal.  Regular rate and rhythm without murmur, gallop or rub. ABD:  symmetric, soft, non-tender  Musculoskeletal : no cyanosis, no clubbing and no edema  Neuro: More interactive today  Skin: No rashes or nodules noted.   Lymph node:  no cervical nodes  Urology: No Painting   Psychiatric: appropriate    DATA:   Recent Labs     04/06/19  0529 04/07/19  1006   WBC 8.5 8.6   HGB 11.0* 11.6*   HCT 32.8* 34.6*   MCV 93.2 93.5    249     Recent Labs     04/06/19  0529 04/07/19  1006    140   K 3.9 3.7    103   CO2 27 27   BUN 14 14   CREATININE 0.34* 0.30*   GLUCOSE 127* 99   CALCIUM 8.7 8.5   LABALBU 3.5 3.6   LABGLOM >60.0 >60.0   GFRAA >60.0 >60.0       MV Settings:     FiO2 : 35 %    No results for input(s): PHART, MHQ0STX, PO2ART, DXS3TEM, BEART, H7HXCVXD in the last 72 hours. O2 Device: Trach mask  O2 Flow Rate (L/min): 8 L/min    DIET TUBE FEED CONTINUOUS/CYCLIC NPO; 1.5 Calorie with Fiber; Gastrostomy; 20; 40     MEDICATIONS during current hospitalization:    Continuous Infusions:      Scheduled Meds:   ipratropium-albuterol  1 ampule Inhalation TID    amikacin (AMIKIN) IVPB  7.5 mg/kg Intravenous Q24H    sodium chloride flush  10 mL Intravenous 2 times per day    enoxaparin  40 mg Subcutaneous Daily    atorvastatin  10 mg Per G Tube Daily    famotidine  20 mg Per G Tube BID    glycopyrrolate  1 mg Per G Tube BID    calcium elemental  500 mg Per G Tube BID    predniSONE  10 mg Oral Daily    sennosides-docusate sodium  2 tablet Per G Tube Daily    vitamin D  5,000 Units Per G Tube Daily    CENTRUM/CERTA-NICOLE with minerals oral  15 mL Per G Tube Daily    gabapentin  400 mg Per G Tube TID    polyethylene glycol  17 g Oral Daily    carBAMazepine  400 mg Per G Tube Daily       PRN Meds:albuterol, sodium chloride flush, acetaminophen, bisacodyl    Radiology  Xr Chest Portable    Result Date: 4/3/2019  EXAMINATION: Portable AP ERECT view of the chest. CLINICAL HISTORY:  Cough , acute on chronic respiratory failure with hypoxia. Cerebral palsy. DATE: 4/2/2019 7:45 PM COMPARISONS: 4/1/2019 FINDINGS: The heart is mildly enlarged, unchanged. Tracheostomy tube is in satisfactory position. Reticular nodular infiltrate left lung base unchanged. Mild reticular nodular infiltrate right base. No pleural effusion or pneumothorax. Levoscoliosis thoracic spine. RETICULAR NODULAR INFILTRATES LUNG BASES, LEFT GREATER THAN RIGHT, SLIGHTLY WORSE IN RIGHT BASE. Xr Chest Portable    Result Date: 4/1/2019  COMPARISON: February 21, 2019; February 24, 2019. HISTORY: recent pneumonia TECHNIQUE: AP view FINDINGS: Opacity is seen in the left lower lobe. The reticulonodular opacities previously seen in the right lung field have significantly diminished. . The cardiac silhouette is within normal limits. A tracheostomy tube is seen in place. There is a levoscoliosis seen in the thoracic spine. Osteopenia is again visualized. Healed fracture of the shaft of the right femur is visualized. Mild airspace disease/infiltrate is again seen in the left lower lobe that does not appear significantly changed.             IMPRESSION AND SUGGESTION:  Patient is at risk due to   · Pseudomonas and serratia  tracheobronchitis  · Chronic respiratory failure s/p  tracheostomy  · Dysphagia status post PEG tube  · History of PE    Recommendations  · Continue current med   · Pulmonary hygiene   · ABX per ID         Electronically signed by Charleen Nguyen MD,  FCCP   on 4/7/2019 at 1:15 PM

## 2019-04-07 NOTE — CONSULTS
Consults                          Consultation and procedure Dictated           Impression: Non functional and deterioration of PEG           Recommend: Replacement of it

## 2019-04-07 NOTE — PROGRESS NOTES
Abdirashid Gonzalez is a 68 y.o.male patient. Urinary tract infection          100,000 CFU/ml   This is a multi-drug resistant organism. CONTACT PRECAUTIONS INDICATED    Testing Performed By     Lab - Abbreviation Name Director Address Valid Date Range   [address] [facility] Zack Garcia MD [address] [id number] 07/[birth year] 0959-Present   Narrative   Performed by: [facility]   ORDER#: [id number]                          ORDERED BY: CARLOTTA Talbot  SOURCE: Urine Clean Catch                  COLLECTED:  04/02/19 20:00  ANTIBIOTICS AT [facility]. :                      RECEIVED :  04/03/19 09:45  CALL [place] [phone],  MDRO results called to and read back by Kevin Woodall, 04/06/2019 08:49, by  [facility]   Susceptibility     Pseudomonas aeruginosa (1)     Antibiotic Interpretation CRISTINA Status    cefepime Intermediate 8 mcg/mL     ciprofloxacin Resistant >=4 mcg/mL     gentamicin Intermediate 8 mcg/mL     imipenem Resistant >=16 mcg/mL     levofloxacin Resistant >=8 mcg/mL     tobramycin Sensitive 2 mcg/mL             ORDER#: [id number]                          ORDERED BY: CARLOTTA Talbot  SOURCE: Sputum Suctioned                   COLLECTED:  04/02/19 20:38  ANTIBIOTICS AT [facility]. :                      RECEIVED :  04/02/19 20:38   Susceptibility     Serratia marcescens (1)     Antibiotic Interpretation CRISTINA Status    amoxicillin-clavulanate Resistant >=32 mcg/mL     cefepime Sensitive <=1 mcg/mL     cefTRIAXone Sensitive <=1 mcg/mL     ciprofloxacin Sensitive 0.5 mcg/mL     gentamicin Sensitive <=1 mcg/mL     trimethoprim-sulfamethoxazole Sensitive <=20 mcg/mL                   Allergies   Allergen Reactions    Ciprofloxacin Hcl      Possible drug rash   unknown    Zosyn [Piperacillin Sod-Tazobactam So]      Unknown reaction     Patient Active Problem List    Diagnosis Date Noted    Acute cystitis without hematuria     Acute tracheitis without airway obstruction

## 2019-04-07 NOTE — PROGRESS NOTES
CO2 26 27 27   BUN 13 14 14   CREATININE 0.34* 0.34* 0.30*   CALCIUM 8.4* 8.7 8.5   PHOS 2.4 2.5 2.4     No results for input(s): AST, ALT, BILIDIR, BILITOT, ALKPHOS in the last 72 hours. No results for input(s): INR in the last 72 hours. No results for input(s): Edgardo Dusky in the last 72 hours. Urinalysis:      Lab Results   Component Value Date    NITRU POSITIVE 04/02/2019    WBCUA >100 04/02/2019    BACTERIA FEW 04/02/2019    RBCUA 10-20 04/02/2019    BLOODU TRACE 04/02/2019    SPECGRAV 1.015 04/02/2019    GLUCOSEU Negative 04/02/2019       Radiology:  XR CHEST PORTABLE   Final Result   RETICULAR NODULAR INFILTRATES LUNG BASES, LEFT GREATER THAN RIGHT, SLIGHTLY WORSE IN RIGHT BASE. Assessment/Plan:    68 y.o. male with history of intellectual disability, cerebral palsy, s/p PEG and tracheostomy placement, chronic Painting catheter, and past history of sepsis who presented with:    MDR P. Aeruginosa UTI   - likely related to chronic Painting catheter(POA)  - repeat u/c positive for MDR Pseudomonas  - blood cultures no growth  - on IV amikacin  - ID following    Possible tracheobronchitis  - sputum culture positive for Serratia marcesens  - continue Duonebs  - pulmonary service following    Dysphagia s/p PEG  - PEG tube occluded, replaced by surgical service   - resume TFs    [facility]        Electronically signed by Gregory Little MD on 4/7/2019 at 11:11 AM

## 2019-04-08 NOTE — PROGRESS NOTES
Nutrition Assessment (Enteral Nutrition)    Type and Reason for Visit: Reassess    Nutrition Recommendations: Continue NPO, Continue current Tube Feeding    Nutrition Assessment: Pt continues to be at nutritional risk due to dysphagia/ EN for nutritional support. To continue TF as ordered/meeting nutritional needs. Malnutrition Assessment:  · Malnutrition Status: No malnutrition  · Context: Chronic illness  · Findings of the 6 clinical characteristics of malnutrition (Minimum of 2 out of 6 clinical characteristics is required to make the diagnosis of moderate or severe Protein Calorie Malnutrition based on AND/ASPEN Guidelines):  1. Energy Intake-(greater than 75% per NH), Greater than or equal to 1 month    2. Weight Loss-No significant weight loss,    3. Fat Loss-No significant subcutaneous fat loss,    4. Muscle Loss-No significant muscle mass loss,    5. Fluid Accumulation-Mild fluid accumulation, Generalized  6.  Strength-Not measured    Nutrition Risk Level: High    Nutrition Needs:  · Estimated Daily Total Kcal: 9073-5184 (kg x 22-25)  · Estimated Daily Protein (g): 58-67 (kg x 1.2-1.4)  · Estimated Daily Fluid (ml/day): ~1500 (1ml/kcal)    Nutrition Diagnosis:   · Problem: Swallowing difficulty  · Etiology: related to Cognitive or neurological impairment     Signs and symptoms:  as evidenced by NPO status due to medical condition, Nutrition support - EN    Objective Information:  · Nutrition-Focused Physical Findings: +1 Gen edema per nsg. IVF d/c'd 4/4. Hx- MR, CP. Pt tolerating TF well per nsg. Last BM 4/7.   · Wound Type: (reddened sacrum noted)  · Current Nutrition Therapies:  · Oral Diet Orders: NPO   · Tube Feeding (TF) Orders:   · Feeding Route: Gastrostomy  · Formula: 1.5 Calorie with Fiber  · Rate (ml/hr):40mls/hr    · Volume (ml/day):  960mls  · Duration: Continuous  · Water Flushes: 150mls q 6hrs  · Current TF & Flush Orders Provides: 1440kcals/61gms protein/1330mls H2O  · Goal TF & Flush Orders Provides: 1440kcals/61gms protein/1330mls H2O  · Anthropometric Measures:  · Ht: 5' (152.4 cm)   · Current Body Wt: 134 lb (60.8 kg)(4-8)  · Admission Body Wt: 138 lb 11.2 oz (62.9 kg)(bedscale)  · Usual Body Wt: 128 lb 8 oz (58.3 kg)(1/2019 EMR; 128.5lb 1/2019 per NH)  · Weight Change: weight gain noted   · Ideal Body Wt: 106 lb (48.1 kg), % Ideal Body >100%  · BMI Classification: BMI 25.0 - 29.9 Overweight    Nutrition Interventions:   Continue NPO, Continue current Tube Feeding  Continued Inpatient Monitoring    Nutrition Evaluation:   · Evaluation: Goal achieved   · Goals: EN to meet estimated nutritional needs. Stable weight.    · Monitoring: TF Intake, TF Tolerance, Weight, Pertinent Labs      Electronically signed by Viri Pickard RD, LD on 4/8/19 at 12:08 PM

## 2019-04-08 NOTE — DISCHARGE INSTR - COC
Continuity of Care Form    Patient Name: Lilia Walton   :  1950  MRN:  [id number]    Admit date:  2019  Discharge date:  19    Code Status Order: Full Code   Advance Directives:   Advance Care Flowsheet Documentation     Date/Time Healthcare Directive Type of Healthcare Directive Copy in [facility] [address] Agent's Name Healthcare Agent's Phone Number    [phone]  No, patient does not have an advance directive for healthcare treatment -- -- -- -- --          Admitting Physician:  Coy Ellison MD  PCP: Claudeen Gum, MD    Discharging Nurse: DIDIER Sanger Unit/Room#: E187/G765-93  Discharging Unit Phone Number: [phone]    Emergency Contact:   Extended Emergency Contact Information  Primary Emergency Contact: Arabella Roca  Address: [address] [place] [address] Phone: [phone]  Mobile Phone: [phone]  Relation: Brother/Sister  Secondary Emergency Contact: P[address] Phone: [phone]  Relation: Aunt/Uncle    Past Surgical History:  History reviewed. No pertinent surgical history. Immunization History: There is no immunization history on file for this patient.     Active Problems:  Patient Active Problem List   Diagnosis Code    Disorder of intellectual development F79    Gastroesophageal reflux disease without esophagitis K21.9    Cerebral palsy ([address] G80.9    Mixed hyperlipidemia E78.2    Vitamin D deficiency E55.9    Sensorineural hearing loss (SNHL) of both ears H90.3    Infantile idiopathic scoliosis of thoracolumbar region M41.05    Age-related osteoporosis without current pathological fracture M81.0    Status post insertion of percutaneous endoscopic gastrostomy (PEG) tube (HCC) Z93.1    Status post tracheostomy ([address] Z93.0    Status post closed fracture of left femur Z87.81    Slow transit constipation K59.01    Personal history of PE (pulmonary embolism) Z86.711    History of DVT of lower extremity Z86.718    Oropharyngeal dysphagia R13.12    Pneumonia J18.9    Sepsis ([facility]) A41.9    Acute on chronic respiratory failure with hypoxia ([facility]) J96.21    Pseudomonas urinary tract infection N39.0, B96.5    Acute tracheitis without airway obstruction J04.10    Acute cystitis without hematuria N30.00       Isolation/Infection:   Isolation          Contact        Patient Infection Status     Infection Encounter Level? Onset Date Added Added By Resolved Resolved By Review Date    MDRO (multi-drug resistant organism) No 04/02/19 02/25/19 Felix Beach RN       P. Aeruginosa MDRO urine on 04/02/2019. Electronically signed by Felix Beach RN on 4/8/2019 at 1:22 PM    P. Aeruginosa MDRO sputum on 2/20/2019.  Electronically signed by Felix Beach RN on 2/25/2019 at 7:20 AM    VRE No 02/20/19 02/23/19 Urine Culture             Nurse Assessment:  Last Vital Signs: /74   Pulse 82   Temp 98.1 °F (36.7 °C) (Oral)   Resp 20   Ht 5' (1.524 m)   Wt 134 lb 11.2 oz (61.1 kg)   SpO2 100%   BMI 26.31 kg/m²     Last documented pain score (0-10 scale):    Last Weight:   Wt Readings from Last 1 Encounters:   04/08/19 134 lb 11.2 oz (61.1 kg)     Mental Status:  disoriented and unable to ass, nonverbal    IV Access:  - Peripheral IV - site  #22 Right Forearm, insertion date: 4/7/19    Nursing Mobility/ADLs:  Walking   Dependent  Transfer  Dependent  Bathing  Dependent  Dressing  Dependent  Toileting  Dependent  Feeding  Dependent  Med Admin  Dependent  Med Delivery   crushed and through PEG tube    Wound Care Documentation and Therapy:        Elimination:  Continence:   · Bowel: No  · Bladder: No  Urinary Catheter: chronic jennings   Colostomy/Ileostomy/Ileal Conduit: No       Date of Last BM: 4/7/19    Intake/Output Summary (Last 24 hours) at 4/8/2019 1346  Last data filed at 4/8/2019 1034  Gross per 24 hour   Intake 570 ml   Output 1250 ml   Net -680 ml     I/O last 3 completed shifts: In: 5 [I.V.:120; NG/GT:300]  Out: 1250 [Urine:1250]    Safety Concerns:      At Risk for Falls and Aspiration Risk    Impairments/Disabilities:      Impaired mobility and cognition    Nutrition Therapy:  Current Nutrition Therapy:   - Oral Diet:  NPO    Routes of Feeding: Gastrostomy Tube  Liquids: No Liquids  Daily Fluid Restriction: no  Last Modified Barium Swallow with Video (Video Swallowing Test): not done    Treatments at the Time of Hospital Discharge:   Respiratory Treatments: tracheostomy, nebulizer  Oxygen Therapy:  6 L per trach mask  Ventilator:    - No ventilator support    Rehab Therapies: Physical Therapy, Occupational Therapy and Speech/Language Therapy  Weight Bearing Status/Restrictions: No weight bearing restirctions  Other Medical Equipment (for information only, NOT a DME order):  wheelchair  Other Treatments: q2 turn, head of bed 30 degrees, every 6 hours 150ml water flush, Jevity 1.5 calorie with fiber continuous 40 ml/hr    Patient's personal belongings (please select all that are sent with patient):  {[facility] DME Belongings:003297567}    RN SIGNATURE:  {Esignature:104685095}    CASE MANAGEMENT/SOCIAL WORK SECTION    Inpatient Status Date: ***    Readmission Risk Assessment Score:  Readmission Risk              Risk of Unplanned Readmission:        18           Discharging to Facility/ Agency   · Name:   · Address:  · Phone:  · Fax:    Dialysis Facility (if applicable)   · Name:  · Address:  · Dialysis Schedule:  · Phone:  · Fax:    / signature: {Esignature:137879417}    PHYSICIAN SECTION    Prognosis: {Prognosis:2456582376}    Condition at Discharge: [address] [place] [address] Patient Condition:178874551}    Rehab Potential (if transferring to Rehab): {Prognosis:0357765777}    Recommended Labs or Other Treatments After Discharge: ***    Physician Certification: I certify the above information and transfer of Eren Deras  is necessary for the continuing treatment of the diagnosis listed and that he requires {Admit to Appropriate Level of Care:49282} for {GREATER/LESS:950466181} 30 days.      Update Admission H&P: {CHP DME Changes in IFB:784610551}    PHYSICIAN SIGNATURE:  {Esignature:569802832}

## 2019-04-08 NOTE — PROGRESS NOTES
Rachel Longoria is a 68 y.o.male patient. Urinary tract infection          100,000 CFU/ml   This is a multi-drug resistant organism. CONTACT PRECAUTIONS INDICATED    Testing Performed By     Lab - Abbreviation Name Director Address Valid Date Range   661-NA - [facility] Katheryn Felix MD [address] [place] [address] 07/12/18 0959-Present   Narrative   Performed by: [facility]   ORDER#: [id number]                          ORDERED BY: CARLOTTA Porras  SOURCE: Urine Clean Catch                  COLLECTED:  04/02/19 20:00  ANTIBIOTICS AT KM. :                      RECEIVED :  04/03/19 09:45  CALL Leticia Goldmann [phone],  MDRO results called to and read back by Jenelle Tapia, 04/06/2019 08:49, by  [facility]   Susceptibility     Pseudomonas aeruginosa (1)     Antibiotic Interpretation CRISTINA Status    cefepime Intermediate 8 mcg/mL     ciprofloxacin Resistant >=4 mcg/mL     gentamicin Intermediate 8 mcg/mL     imipenem Resistant >=16 mcg/mL     levofloxacin Resistant >=8 mcg/mL     tobramycin Sensitive 2 mcg/mL             ORDER#: [id number]                          ORDERED BY: CARLOTTA Porras  SOURCE: Sputum Suctioned                   COLLECTED:  04/02/19 20:38  ANTIBIOTICS AT KM. :                      RECEIVED :  04/02/19 20:38   Susceptibility     Serratia marcescens (1)     Antibiotic Interpretation CRISTINA Status    amoxicillin-clavulanate Resistant >=32 mcg/mL     cefepime Sensitive <=1 mcg/mL     cefTRIAXone Sensitive <=1 mcg/mL     ciprofloxacin Sensitive 0.5 mcg/mL     gentamicin Sensitive <=1 mcg/mL     trimethoprim-sulfamethoxazole Sensitive <=20 mcg/mL                   Allergies   Allergen Reactions    Ciprofloxacin Hcl      Possible drug rash   unknown    Zosyn [Piperacillin Sod-Tazobactam So]      Unknown reaction     Patient Active Problem List    Diagnosis Date Noted    Acute cystitis without hematuria     Acute tracheitis without airway obstruction  Pseudomonas urinary tract infection 04/02/2019    Sepsis ([address]     Acute on chronic respiratory failure with hypoxia (HCC)     Pneumonia 02/20/2019    Disorder of intellectual development 02/17/2019    Gastroesophageal reflux disease without esophagitis 02/17/2019    Cerebral palsy ([address] 02/17/2019    Mixed hyperlipidemia 02/17/2019    Vitamin D deficiency 02/17/2019    Sensorineural hearing loss (SNHL) of both ears 02/17/2019    Infantile idiopathic scoliosis of thoracolumbar region 02/17/2019    Age-related osteoporosis without current pathological fracture 02/17/2019    Status post insertion of percutaneous endoscopic gastrostomy (PEG) tube ([address] 02/17/2019    Status post tracheostomy ([facility] 02/17/2019    Status post closed fracture of left femur 02/17/2019    Slow transit constipation 02/17/2019    Personal history of PE (pulmonary embolism) 02/17/2019    History of DVT of lower extremity 02/17/2019    Oropharyngeal dysphagia 02/17/2019       /74   Pulse 82   Temp 98.1 °F (36.7 °C) (Oral)   Resp 20   Ht 5' (1.524 m)   Wt 134 lb 11.2 oz (61.1 kg)   SpO2 100%   BMI 26.31 kg/m²     Review of Systems   Unable to perform ROS: Patient nonverbal       Physical Exam   HENT:   Head: Normocephalic. Cardiovascular: Normal heart sounds. No murmur heard. Pulmonary/Chest: No respiratory distress. He has no wheezes. He has no rales. Abdominal: Soft. He exhibits no distension and no mass. There is no tenderness. There is no rebound and no guarding. Neurological: He is alert. Skin: Skin is warm. No rash noted. No erythema.        Assessment/Plan:  Urinary tract infection given urinalysis      continue amikacin  For 2 days more at this time to cover pseudomonas in urine

## 2019-04-08 NOTE — CARE COORDINATION
DIANNEW spoke with Arnav Yeung at [facility] and we discussed his need for IV ATB x 2 more days on a peripheral line. She is most concerned about getting the ATB in house for pt. LSW faxed the info for IV ATB and the script will follow as soon as Dr. Alexis Diaz is here. Ambulance set for  at 6:30pm today. [facility] Care is ready for pt to return.

## 2019-04-08 NOTE — CONSULTS
[address]                      [address], [address] [facility]                                  CONSULTATION    PATIENT NAME: Gildardo Phipps                        :        1950  MED REC NO:   [id number]                            ROOM:       L590  ACCOUNT NO:   [de-identified]                           ADMIT DATE: 2019  PROVIDER:     Nano Farias MD    CONSULT DATE:  2019    HISTORY OF PRESENT ILLNESS:  This is a 68-year-old male patient seen in  consultation for evaluation of nonfunctional gastrostomy tube. This  patient was recently admitted because of the bacteremia and also  positive for VRE in the urine and pseudomonas in the sputum. This  patient otherwise is stable and alert. PAST MEDICAL HISTORY:  The patient has disorder of intellectual  development, has gastroesophageal reflux, cerebral palsy,  hyperlipidemia, vitamin D deficiency, he has a hearing loss, infantile  idiopathic scoliosis, and osteoporosis. The patient is status post PEG  placement, status post tracheostomy, status post closed fracture of the  left femur, also history of chronic constipation. History of PE, DVT,  dysphagia, pneumonia, sepsis, and chronic respiratory failure with  hypoxia. PAST SURGICAL HISTORY:  The patient had a previous tracheostomy as well  as a PEG tube placement. SOCIAL HISTORY:  The patient has no history of alcohol or tobacco, nor  drugs. HOME MEDICATIONS:  The patient was on albuterol, Prolia, Tegretol,  Neurontin, Lipitor, Pepcid, Robinul, Tylenol, oyster shell, Deltasone,  Dulcolax, Senokot, vitamin D, multivitamin, and Protonix. ALLERGIES:  He is allergic to CIPROFLOXACIN and ZOSYN. PHYSICAL EXAMINATION:  GENERAL:  Otherwise, the patient is stable. VITAL SIGNS:  Temperature 97.9, respirations 20, last blood pressure  97/54. HEENT:  Sclerae, no jaundice. NECK:  Supple.   The patient with a collar tracheostomy tube in place  with mild secretion, otherwise. HEART:  Regular rhythm. ABDOMEN:  Soft, nondistended, and no tenderness. Gastrostomy tube  appeared to have stained material, already weak and collapsed easily,  aggravated with turning, but no drainage around the tube. No redness. LABORATORY TESTS:  The electrolytes are normal.  BUN and creatinine  normal.  The liver function test normal.  The CBC with normal WBC,  hemoglobin 11.6, hematocrit 34.6, and platelet count 252,141. IMPRESSION:  The patient has deteriorating gastrostomy tube and  nonfunctional at the present time. RECOMMENDATION:  My recommendation, the patient need to be replaced. PROCEDURE:  With the patient at bedside, after the skin around the tube  was cleaned the balloon of the previous gastrostomy tube was deflated. A new #22 gastrostomy tube inserted through the previous old sinus tract  without any difficulty. After balloon inflated, the tube was left in  place, it is functional at the present time that can be used any time.         Nanda Benitez MD    D: 04/07/2019 12:59:24       T: 04/07/2019 21:05:29     [id number]  Job#: [id number]     Doc#: [id number]    CC:  Dr. Sasha Nguyen

## 2019-04-08 NOTE — FLOWSHEET NOTE
Report called to Zachery Purcell RN at [facility].  Electronically signed by Broderick Aggarwal RN on 4/8/19 at 4:48 PM

## 2019-04-08 NOTE — PROGRESS NOTES
INPATIENT PROGRESS NOTES    PATIENT NAME: Cheryl Funk  MRN: [id number]  SERVICE DATE:  April 8, 2019   SERVICE TIME:  3:45 PM      PRIMARY SERVICE: Pulmonary Disease    CHIEF COMPLAINTS: Tracheobronchitis     INTERVAL HPI: Patient seen and examined at bedside, Interval Notes, orders reviewed. Nursing notes noted    More interactive today, shakes hand, no distress, still with thick secretions, no pain, no reported fever     Review of system:       Unable to provide     Social History     Tobacco Use    Smoking status: Never Smoker    Smokeless tobacco: Never Used   Substance Use Topics    Alcohol use: No     History reviewed. No pertinent family history. OBJECTIVE    Body mass index is 26.31 kg/m². PHYSICAL EXAM:  Vitals:  /74   Pulse 82   Temp 98.1 °F (36.7 °C) (Oral)   Resp 20   Ht 5' (1.524 m)   Wt 134 lb 11.2 oz (61.1 kg)   SpO2 100%   BMI 26.31 kg/m²   General:Alert, awake . comfortable in bed, No distress. appears stated age and cooperative  Head: Atraumatic , Normocephalic   Eyes: PERRL. No sclera icterus. No conjunctival injection. No discharge   ENT: No nasal  discharge. Pharynx clear. lips, teeth, mucosa and gums are normal, tongue protrudes in the midline  Neck:  Trachea midline. No thyromegaly, no JVD, No cervical adenopathy. s/p trach on TC  Chest : Bilaterally symmetrical ,Normal effort,  No accessory muscle use  Lung : . Fair BS bilateral, decreased BS at bases. No Rales. No wheezing. No rhonchi. No dullness on percussion. Heart[de-identified] Normal  rate. Regular rhythm. No mumur ,  Rub or gallop  ABD: Non-tender. Non-distended. No masses. No organmegaly. Normal bowel sounds. No hernia. Ext : No Pitting both leg , No Cyanosis No clubbing  Neuro: unable to examine in detail.        DATA:   Recent Labs     04/07/19  1006 04/08/19  0528   WBC 8.6 8.8   HGB 11.6* 11.7*   HCT 34.6* 34.9*   MCV 93.5 93.7    257     Recent Labs     04/07/19  1006 04/08/19  0528    140   K 3.7 3.9   CL 103 103   CO2 27 26   BUN 14 14   CREATININE 0.30* 0.35*   GLUCOSE 99 122*   CALCIUM 8.5 8.6   LABALBU 3.6 3.7   LABGLOM >60.0 >60.0   GFRAA >60.0 >60.0       MV Settings:     FiO2 : 35 %    No results for input(s): PHART, KZQ3XYK, PO2ART, XWL9GFI, BEART, [id number] in the last 72 hours. O2 Device: Trach mask  O2 Flow Rate (L/min): 6 L/min    DIET TUBE FEED CONTINUOUS/CYCLIC NPO; 1.5 Calorie with Fiber; Gastrostomy; 20; 40     MEDICATIONS during current hospitalization:    Continuous Infusions:      Scheduled Meds:   ipratropium-albuterol  1 ampule Inhalation TID    amikacin (AMIKIN) IVPB  7.5 mg/kg Intravenous Q24H    sodium chloride flush  10 mL Intravenous 2 times per day    enoxaparin  40 mg Subcutaneous Daily    atorvastatin  10 mg Per G Tube Daily    famotidine  20 mg Per G Tube BID    glycopyrrolate  1 mg Per G Tube BID    calcium elemental  500 mg Per G Tube BID    predniSONE  10 mg Oral Daily    sennosides-docusate sodium  2 tablet Per G Tube Daily    vitamin D  5,000 Units Per G Tube Daily    CENTRUM/CERTA-NICOLE with minerals oral  15 mL Per G Tube Daily    gabapentin  400 mg Per G Tube TID    polyethylene glycol  17 g Oral Daily    carBAMazepine  400 mg Per G Tube Daily       PRN Meds:albuterol, sodium chloride flush, acetaminophen, bisacodyl    Radiology  Xr Chest Portable    Result Date: 4/3/2019  EXAMINATION: Portable AP ERECT view of the chest. CLINICAL HISTORY:  Cough , acute on chronic respiratory failure with hypoxia. Cerebral palsy. DATE: 4/2/2019 7:45 PM COMPARISONS: 4/1/2019 FINDINGS: The heart is mildly enlarged, unchanged. Tracheostomy tube is in satisfactory position. Reticular nodular infiltrate left lung base unchanged. Mild reticular nodular infiltrate right base. No pleural effusion or pneumothorax. Levoscoliosis thoracic spine. RETICULAR NODULAR INFILTRATES LUNG BASES, LEFT GREATER THAN RIGHT, SLIGHTLY WORSE IN RIGHT BASE.      Xr Chest Portable    Result Date: 4/1/2019  COMPARISON: February 21, 2019; February 24, 2019. HISTORY: recent pneumonia TECHNIQUE: AP view FINDINGS: Opacity is seen in the left lower lobe. The reticulonodular opacities previously seen in the right lung field have significantly diminished. . The cardiac silhouette is within normal limits. A tracheostomy tube is seen in place. There is a levoscoliosis seen in the thoracic spine. Osteopenia is again visualized. Healed fracture of the shaft of the right femur is visualized. Mild airspace disease/infiltrate is again seen in the left lower lobe that does not appear significantly changed. IMPRESSION AND SUGGESTION:    1. Pseudomonas and serratia  tracheobronchitis  2. Chronic respiratory failure s/p  tracheostomy  3. Dysphagia status post PEG tube  4. History of PE    Continue current med , Pulmonary hygiene ,ABX per ID , O2 via TC. , he is going to d/c today.          Electronically signed by Anupam Sims MD,  FCCP   on 4/8/2019 at 3:45 PM

## 2019-04-09 NOTE — DISCHARGE INSTR - COC
Continuity of Care Form    Patient Name: Percy Busch   :  1950  MRN:  [id number]    Admit date:  2019  Discharge date:  ***    Code Status Order: Prior   Advance Directives:     Admitting Physician:  No admitting provider for patient encounter. PCP: Blane Bedolla MD    Discharging Nurse: [facility] Unit/Room#:   Discharging Unit Phone Number: ***    Emergency Contact:   Extended Emergency Contact Information  Primary Emergency Contact: ChanelleArabella  Address: [address] [facility] [address] [place] Javier Netter of [address] [place] [address] Phone: [phone]  Mobile Phone: [phone]  Relation: Brother/Sister  Secondary Emergency Contact: Pito of [address] [place] [address] Phone: [phone]  Relation: Aunt/Uncle    Past Surgical History:  History reviewed. No pertinent surgical history. Immunization History: There is no immunization history on file for this patient.     Active Problems:  Patient Active Problem List   Diagnosis Code    Disorder of intellectual development F79    Gastroesophageal reflux disease without esophagitis K21.9    Cerebral palsy ([address] G80.9    Mixed hyperlipidemia E78.2    Vitamin D deficiency E55.9    Sensorineural hearing loss (SNHL) of both ears H90.3    Infantile idiopathic scoliosis of thoracolumbar region M41.05    Age-related osteoporosis without current pathological fracture M81.0    Status post insertion of percutaneous endoscopic gastrostomy (PEG) tube ([facility]) Z93.1    Status post tracheostomy ([address] Z93.0    Status post closed fracture of left femur Z87.81    Slow transit constipation K59.01    Personal history of PE (pulmonary embolism) Z86.711    History of DVT of lower extremity Z86.718    Oropharyngeal dysphagia R13.12    Pneumonia J18.9    Sepsis ([facility]) A41.9    Acute on chronic respiratory failure with hypoxia ([facility]) J96.21    Pseudomonas urinary tract infection N39.0, B96.5    Acute tracheitis without airway

## 2019-04-09 NOTE — ED NOTES
Patient sent here from [address] [place] [address] for IV insertion. 22 G IV inserted with assistance and secured /wrapped with kerlix dressing. Patient tolerated well.      Glenys Mcfadden RN  04/09/19 1563

## 2019-04-09 NOTE — ED PROVIDER NOTES
[facility] ED  eMERGENCY dEPARTMENT eNCOUnter      Pt Name: Rima Mares  MRN: [id number]  [place] 1950  Date of evaluation: 4/9/2019  Provider: Nilda Pablo MD    CHIEF COMPLAINT       Chief Complaint   Patient presents with    IV Medication     Pt needs IV start for therapy at SNF         HISTORY OF PRESENT ILLNESS   (Location/Symptom, Timing/Onset, Context/Setting, Quality, Duration, Modifying Factors, Severity)  Note limiting factors. Rima Mares is a 68 y.o. male who presents to the emergency department for placement of peripheral IV. He was discharged from this facility with pneumonia to a SNF but has no working IV for IV abx treatment. HPI    Nursing Notes were reviewed. REVIEW OF SYSTEMS    (2-9 systems for level 4, 10 or more for level 5)       REVIEW OF SYSTEMS    Limited due to patient baseline clinical status - nonverbal, MRDD     Cardiovascular: Positive for lack of peripheral IV site     All other systems reviewed and are negative. Except as noted above theremainder of the review of systems was reviewed and negative.        PASTMEDICAL HISTORY     Past Medical History:   Diagnosis Date    Age-related osteoporosis without current pathological fracture 2/17/2019    Disorder of intellectual development 2/17/2019    Essential hypertension 2/17/2019    Gastroesophageal reflux disease without esophagitis 2/17/2019    History of DVT of lower extremity 2/17/2019    Infantile idiopathic scoliosis of thoracolumbar region 2/17/2019    Long term (current) use of antibiotics 2/17/2019    Mixed hyperlipidemia 2/17/2019    Oropharyngeal dysphagia 2/17/2019    Personal history of PE (pulmonary embolism) 2/17/2019    Sensorineural hearing loss (SNHL) of both ears 2/17/2019    Slow transit constipation 2/17/2019    Spastic diplegic cerebral palsy ([address] 2/17/2019    Status post closed fracture of left femur 2/17/2019    Status post insertion of percutaneous endoscopic gastrostomy note:    No orders to display         ED BEDSIDE ULTRASOUND:   Performed by ED Physician - none    LABS:  Labs Reviewed - No data to display    All other labs were within normal range or not returned as of this dictation. EMERGENCY DEPARTMENT COURSE and DIFFERENTIAL DIAGNOSIS/MDM:   Vitals:    Vitals:    04/09/19 1349   BP: 110/75   Pulse: 70   Resp: 18   Temp: 96.5 °F (35.8 °C)   TempSrc: Tympanic   SpO2: 97%       Noted    MDM    CRITICAL CARE TIME   Total Critical Care time was 0 minutes. EDCOURSE       CONSULTS:  None    PROCEDURES:  Unlessotherwise noted below, none     Procedures      Summation      Patient with MRDD and just discharged to SNF with pneumonia returned for peripheral IV placement. Otherwise he appears well and nontoxic. Patient Course:        ED Medicationsadministered this visit:  Medications - No data to display    New Prescriptions from this visit:    Discharge Medication List as of 4/9/2019  2:39 PM          Follow-up:  [facility] ED  [address] [address] [address] [phone]    As needed, If symptoms worsen        Final Impression:   1. Lack of intravenous access               (Please note that portions of this note werecompleted with a voice recognition program.  Efforts were made to edit the dictations but occasionally words are mis-transcribed.)    FINAL IMPRESSION      1.  Lack of intravenous access          DISPOSITION/PLAN   DISPOSITION Decision To Discharge 04/09/2019 02:17:36 PM      PATIENT REFERRED TO:  [facility] ED  [address] [address]  [phone]    As needed, If symptoms worsen      DISCHARGE MEDICATIONS:  Discharge Medication List as of 4/9/2019  2:39 PM             (Please note that portions of this note were completed with a voice recognition program.  Efforts were made to edit the dictations but occasionally words are mis-transcribed.)    Ama Akers MD (electronically signed)  Attending Emergency

## 2019-04-18 NOTE — ED PROVIDER NOTES
[facility] ED  eMERGENCY dEPARTMENT eNCOUnter      Pt Name: Lynne Markham  MRN: [id number]  [place] 1950  Date of evaluation: 4/17/2019  Provider: MD Gregg Blankenship       Chief Complaint   Patient presents with    Shortness of Breath       HISTORY OF PRESENT ILLNESS   (Location/Symptom, Timing/Onset,Context/Setting, Quality, Duration, Modifying Factors, Severity)  Note limiting factors. Lynne Markham is a 68 y.o. male who presents to the emergency department patient with a history of mental retardation patient is in a group home and patient has a cerebral palsy and is status post tracheostomy [facility] [employer] reflux patient noted to be short of breath and cyanosis with drop in pulse ox so paramedics were called paramedics and adjusted his oxygen in the pulse ox    On the way to 96% and his  cyanosis disappeared    HPI    NursingNotes were reviewed. REVIEW OF SYSTEMS    (2-9 systems for level 4, 10 or more for level 5)     Review of Systems   Constitutional: Negative. Negative for activity change and fever. HENT: Negative for congestion, drooling, facial swelling, mouth sores, nosebleeds, sinus pressure, sore throat, trouble swallowing and voice change. Eyes: Negative for pain, discharge, redness and visual disturbance. Respiratory: Positive for shortness of breath and wheezing. Negative for cough, choking, chest tightness and stridor. Cardiovascular: Negative for chest pain, palpitations and leg swelling. Gastrointestinal: Negative for abdominal pain, blood in stool, constipation, diarrhea and vomiting. Endocrine: Negative for cold intolerance, polyphagia and polyuria. Genitourinary: Negative for dysuria, flank pain, frequency, genital sores and urgency. Musculoskeletal: Negative for back pain, joint swelling, neck pain and neck stiffness. Skin: Negative for pallor and rash. Neurological: Negative for tremors, seizures, syncope, weakness, numbness and headaches. Hematological: Negative for adenopathy. Does not bruise/bleed easily. Psychiatric/Behavioral: Negative for agitation, behavioral problems, hallucinations and sleep disturbance. The patient is not hyperactive. All other systems reviewed and are negative. Except as noted above the remainder of the review of systems was reviewed and negative. PAST MEDICAL HISTORY     Past Medical History:   Diagnosis Date    Age-related osteoporosis without current pathological fracture 2/17/2019    Disorder of intellectual development 2/17/2019    Essential hypertension 2/17/2019    Gastroesophageal reflux disease without esophagitis 2/17/2019    History of DVT of lower extremity 2/17/2019    Infantile idiopathic scoliosis of thoracolumbar region 2/17/2019    Long term (current) use of antibiotics 2/17/2019    Mixed hyperlipidemia 2/17/2019    Oropharyngeal dysphagia 2/17/2019    Personal history of PE (pulmonary embolism) 2/17/2019    Sensorineural hearing loss (SNHL) of both ears 2/17/2019    Slow transit constipation 2/17/2019    Spastic diplegic cerebral palsy ([address] 2/17/2019    Status post closed fracture of left femur 2/17/2019    Status post insertion of percutaneous endoscopic gastrostomy (PEG) tube ([address] 2/17/2019    Status post tracheostomy ([address] 2/17/2019    Vitamin D deficiency 2/17/2019         SURGICALHISTORY     History reviewed. No pertinent surgical history. CURRENT MEDICATIONS       Previous Medications    ACETAMINOPHEN (TYLENOL) 325 MG TABLET    Take 650 mg by mouth every 4 hours as needed for Pain or Fever    ALBUTEROL (PROVENTIL) (2.5 MG/3ML) 0.083% NEBULIZER SOLUTION    Take 3 mLs by nebulization three times daily    AMIKACIN (AMIKIN) INFUSION    Infuse 458.3 mg intravenously every 24 hours Compound per protocol.     ATORVASTATIN (LIPITOR) 10 MG TABLET    10 mg by Per G Tube route daily     BISACODYL (DULCOLAX) 10 MG SUPPOSITORY    Place 10 mg rectally daily as needed for Constipation    CARBAMAZEPINE (TEGRETOL) 100 MG/5ML SUSPENSION    200 mg by Per G Tube route daily     CARBAMAZEPINE (TEGRETOL) 100 MG/5ML SUSPENSION    400 mg by Per G Tube route daily    DENOSUMAB (PROLIA) 60 MG/ML SOLN SC INJECTION    Inject 60 mg into the skin once    FAMOTIDINE (PEPCID) 20 MG TABLET    20 mg by Per G Tube route 2 times daily     GABAPENTIN (NEURONTIN) 400 MG CAPSULE    1 capsule by Per G Tube route 3 times daily for 30 days. GLYCOPYRROLATE (ROBINUL) 1 MG TABLET    1 mg by Per G Tube route 2 times daily     MULTIPLE VITAMINS-MINERALS (THERAPEUTIC MULTIVITAMIN-MINERALS) TABLET    Take 1 tablet by mouth daily     OYSTER SHELL 500 MG TABS    500 mg by Per G Tube route 2 times daily     PANTOPRAZOLE SODIUM (PROTONIX) 40 MG PACK PACKET    Take 1 packet by mouth every morning (before breakfast)    POLYETHYLENE GLYCOL (GLYCOLAX) POWDER    Take 17 g by mouth daily    PREDNISONE (DELTASONE) 10 MG TABLET    Take 10 mg by mouth daily     SENNOSIDES-DOCUSATE SODIUM (SENOKOT-S) 8.6-50 MG TABLET    2 tablets by Per G Tube route daily    VITAMIN D (CHOLECALCIFEROL) 1000 UNIT TABS TABLET    5,000 Units by Per G Tube route daily       ALLERGIES     Ciprofloxacin hcl and Zosyn [piperacillin sod-tazobactam so]    FAMILY HISTORY     History reviewed. No pertinent family history.        SOCIAL HISTORY       Social History     Socioeconomic History    Marital status: Single     Spouse name: None    Number of children: None    Years of education: None    Highest education level: None   Occupational History    None   Social Needs    Financial resource strain: None    Food insecurity:     Worry: None     Inability: None    Transportation needs:     Medical: None     Non-medical: None   Tobacco Use    Smoking status: Never Smoker    Smokeless tobacco: Never Used   Substance and Sexual Activity    Alcohol use: No    Drug use: No    Sexual activity: Never   Lifestyle    Physical activity:     Days per week: None     Minutes per session: None    Stress: None   Relationships    Social connections:     Talks on phone: None     Gets together: None     Attends Taoist service: None     Active member of club or organization: None     Attends meetings of clubs or organizations: None     Relationship status: None    Intimate partner violence:     Fear of current or ex partner: None     Emotionally abused: None     Physically abused: None     Forced sexual activity: None   Other Topics Concern    None   Social History Narrative    None       SCREENINGS      @FLOW([id number])@      PHYSICAL EXAM    (up to 7 for level 4, 8 or more for level 5)     ED Triage Vitals   BP Temp Temp src Pulse Resp SpO2 Height Weight   -- -- -- -- -- -- -- --       Physical Exam   Constitutional: He is oriented to person, place, and time. He appears well-developed. On arrival patient in no respiratory distress cooperative for my examination   HENT:   Head: Normocephalic. Right Ear: External ear normal.   Left Ear: External ear normal.   Mouth/Throat: Oropharynx is clear and moist.   Eyes: Pupils are equal, round, and reactive to light. Neck: Normal range of motion. Neck supple. Cardiovascular: Normal rate, regular rhythm and normal heart sounds. Exam reveals no gallop. No murmur heard. Pulmonary/Chest: Breath sounds normal. No respiratory distress. He has no wheezes. Abdominal: Soft. Bowel sounds are normal. He exhibits no mass. There is no rebound. Musculoskeletal: Normal range of motion. He exhibits no edema or tenderness. Neurological: He is alert and oriented to person, place, and time. No cranial nerve deficit. He exhibits normal muscle tone. Skin: Skin is warm. No rash noted. No erythema. Psychiatric: His behavior is normal. Thought content normal.   Nursing note and vitals reviewed.       DIAGNOSTIC RESULTS     EKG: All EKG's are interpreted by the Emergency Department Physician who either signs or Co-signsthis chart in the absence of a cardiologist.        RADIOLOGY:   [employer] Artist such as CT, Ultrasound and MRI are read by the radiologist. Plain radiographic images are visualized and preliminarily interpreted by the emergency physician with the below findings:        Interpretation per the Radiologist below, if available at the time ofthis note:    XR CHEST PORTABLE    (Results Pending)         ED BEDSIDE ULTRASOUND:   Performed by ED Physician - none    LABS:  Labs Reviewed - No data to display    All other labs were within normal range or not returned as of this dictation. EMERGENCY DEPARTMENT COURSE and DIFFERENTIAL DIAGNOSIS/MDM:   Vitals:    Vitals:    04/17/19 2300   BP: 110/63   Pulse: 104   Temp: 99.3 °F (37.4 °C)   TempSrc: Tympanic   SpO2: 98%   Weight: 140 lb (63.5 kg)   Height: 5' 2\" (1.575 m)           MDM  Number of Diagnoses or Management Options  Diagnosis management comments: After arrival patient maintaining his pulse ox and color looks pink no cyanosis noted. Distress of any kind patient given some aggressive tracheostomy suction and aerosol treatment and observed patient much improved and no respiratory distress most likely some technical issue with the oxygen as well as maybe some secretions from her tracheostomy tube cause him deterioration and lowering his pulse ox which is pending At this time and maintaining well patient be sent back to the group home and advised to use: Oxygen cautiously      CRITICAL CARE TIME   Total Critical Care time was  minutes, excluding separately reportableprocedures. There was a high probability of clinicallysignificant/life threatening deterioration in the patient's condition which required my urgent intervention. ONSULTS:  None    PROCEDURES:  Unless otherwise noted below, none     Procedures    FINAL IMPRESSION      1. Hypoxia    2.  S/P tracheoplasty          DISPOSITION/PLAN   DISPOSITION        PATIENT REFERRED TO:  No follow-up provider specified.     DISCHARGE MEDICATIONS:  New Prescriptions    No medications on file          (Please note that portions of this note were completed with a voice recognition program.  Efforts were made to edit the dictations but occasionally words are mis-transcribed.)    Naima Roberson MD (electronically signed)  Attending Emergency Physician       Naima Roberson MD  04/18/19 7393

## 2019-04-18 NOTE — ED TRIAGE NOTES
Pt comes to the ED with a low pulse ox after a treatment and suctioning at [facility].  Pt arrived on O2 at 15L and was dropped to 4L trach mask

## 2019-04-25 NOTE — ED NOTES
Bed: 11  Expected date:   Expected time:   Means of arrival:   Comments:     Scott Dhillon RN  04/25/19 7477

## 2019-04-25 NOTE — ED PROVIDER NOTES
[facility] ED  eMERGENCY dEPARTMENT eNCOUnter      Pt Name: Eren Deras  MRN: [id number]  [place] 1950  Date of evaluation: 4/25/2019  Provider: Yris Castellanos MD    CHIEF COMPLAINT       Chief Complaint   Patient presents with    Shortness of Breath     Pt sent from [address] [place] [address] for low Pulse ox of 79% RA, Pt has a trach, non-verbal MR, Pt needs inner canula changed, none at facility. EMS Pulse Ox 97% RA         HISTORY OF PRESENT ILLNESS   (Location/Symptom, Timing/Onset,Context/Setting, Quality, Duration, Modifying Factors, Severity)  Note limiting factors. Eren Deras is a 68 y.o. male who presents to the emergency department with complaint of shortness of breath and low pulse oximetry per nursing home staff. EMS found patient with pulse ox of 97% on room air. Patient is nonverbal and offered no complaint. Appears comfortable. MRDD. HPI    Nursing Notes were reviewed. REVIEW OF SYSTEMS    (2-9 systems for level 4, 10 or more for level 5)     Review of Systems   Constitutional: Negative. Negative for activity change, appetite change, chills, fatigue and fever. HENT: Negative for congestion, ear discharge, ear pain, hearing loss, rhinorrhea, sinus pressure and sore throat. Eyes: Negative for photophobia, pain and visual disturbance. Respiratory: Positive for shortness of breath. Negative for apnea, cough and wheezing. Cardiovascular: Negative for chest pain, palpitations and leg swelling. Gastrointestinal: Negative for abdominal distention, abdominal pain, constipation, diarrhea, nausea and vomiting. Endocrine: Negative for cold intolerance, heat intolerance and polyuria. Genitourinary: Negative for dysuria, flank pain, frequency and urgency. Musculoskeletal: Negative for arthralgias, back pain, gait problem, myalgias and neck stiffness. Skin: Negative for color change, pallor and rash. Allergic/Immunologic: Negative for food allergies and immunocompromised state. Neurological: Negative for dizziness, tremors, syncope, weakness, light-headedness and headaches. Psychiatric/Behavioral: Negative for agitation, confusion and hallucinations. All other systems reviewed and are negative. Except as noted above the remainder of the review of systems was reviewed and negative. PAST MEDICAL HISTORY     Past Medical History:   Diagnosis Date    Age-related osteoporosis without current pathological fracture 2/17/2019    Disorder of intellectual development 2/17/2019    Essential hypertension 2/17/2019    Gastroesophageal reflux disease without esophagitis 2/17/2019    GERD (gastroesophageal reflux disease)     Hearing loss     History of DVT of lower extremity 2/17/2019    Infantile idiopathic scoliosis of thoracolumbar region 2/17/2019    Long term (current) use of antibiotics 2/17/2019    Mixed hyperlipidemia 2/17/2019    Oropharyngeal dysphagia 2/17/2019    Personal history of PE (pulmonary embolism) 2/17/2019    Seizures (HCC)     Sensorineural hearing loss (SNHL) of both ears 2/17/2019    Slow transit constipation 2/17/2019    Spastic diplegic cerebral palsy ([address] 2/17/2019    Status post closed fracture of left femur 2/17/2019    Status post insertion of percutaneous endoscopic gastrostomy (PEG) tube ([address] 2/17/2019    Status post tracheostomy ([address] 2/17/2019    Vitamin D deficiency 2/17/2019         SURGICAL HISTORY     History reviewed. No pertinent surgical history. CURRENT MEDICATIONS       Discharge Medication List as of 4/25/2019  9:06 PM      CONTINUE these medications which have NOT CHANGED    Details   gabapentin (NEURONTIN) 400 MG capsule 1 capsule by Per G Tube route 3 times daily for 30 days. , Disp-90 capsule, R-0Normal      albuterol (PROVENTIL) (2.5 MG/3ML) 0.083% nebulizer solution Take 3 mLs by nebulization three times daily, Disp-120 each, R-3DC to SNF      polyethylene glycol (GLYCOLAX) powder Take 17 g by mouth dailyHistorical Med      denosumab (PROLIA) 60 MG/ML SOLN SC injection Inject 60 mg into the skin onceHistorical Med      !! carBAMazepine (TEGRETOL) 100 MG/5ML suspension 400 mg by Per G Tube route dailyHistorical Med      atorvastatin (LIPITOR) 10 MG tablet 10 mg by Per G Tube route daily Historical Med      !! carBAMazepine (TEGRETOL) 100 MG/5ML suspension 200 mg by Per G Tube route daily Historical Med      famotidine (PEPCID) 20 MG tablet 20 mg by Per G Tube route 2 times daily Historical Med      Oyster Shell 500 MG TABS 500 mg by Per G Tube route 2 times daily Historical Med      acetaminophen (TYLENOL) 325 MG tablet Take 650 mg by mouth every 4 hours as needed for Pain or FeverHistorical Med      bisacodyl (DULCOLAX) 10 MG suppository Place 10 mg rectally daily as needed for ConstipationHistorical Med      predniSONE (DELTASONE) 10 MG tablet Take 10 mg by mouth daily Historical Med      sennosides-docusate sodium (SENOKOT-S) 8.6-50 MG tablet 2 tablets by Per G Tube route dailyHistorical Med      vitamin D (CHOLECALCIFEROL) 1000 UNIT TABS tablet 5,000 Units by Per G Tube route dailyHistorical Med      Multiple Vitamins-Minerals (THERAPEUTIC MULTIVITAMIN-MINERALS) tablet Take 1 tablet by mouth daily Historical Med      amikacin (AMIKIN) infusion Infuse 458.3 mg intravenously every 24 hours Compound per protocol., Disp-2 each, R-0Print      pantoprazole sodium (PROTONIX) 40 MG PACK packet Take 1 packet by mouth every morning (before breakfast), Disp-30 each, R-3DC to SNF      glycopyrrolate (ROBINUL) 1 MG tablet 1 mg by Per G Tube route 2 times daily Historical Med       !! - Potential duplicate medications found. Please discuss with provider. ALLERGIES     Ciprofloxacin hcl and Zosyn [piperacillin sod-tazobactam so]    FAMILY HISTORY     History reviewed. No pertinent family history.        SOCIAL HISTORY       Social History     Socioeconomic History    Marital status: Single     Spouse name: None intact distal pulses. Exam reveals no gallop and no friction rub. No murmur heard. Pulmonary/Chest: Effort normal and breath sounds normal. No stridor. No respiratory distress. He has no wheezes. He has no rales. He exhibits no tenderness. Abdominal: Soft. Bowel sounds are normal. He exhibits no distension and no mass. There is no tenderness. There is no rebound and no guarding. Musculoskeletal: Normal range of motion. He exhibits no edema, tenderness or deformity. Lymphadenopathy:     He has no cervical adenopathy. Neurological: He is alert and oriented to person, place, and time. He has normal reflexes. He displays normal reflexes. No cranial nerve deficit. He exhibits normal muscle tone. Coordination normal.   Skin: Skin is warm and dry. No rash noted. He is not diaphoretic. No erythema. No pallor. Psychiatric: He has a normal mood and affect. His behavior is normal. Judgment and thought content normal.   Nursing note and vitals reviewed. DIAGNOSTIC RESULTS     EKG: All EKG's are interpreted by the Emergency Department Physician who either signs or Co-signs this chart in the absence of a cardiologist.    Twelve-lead EKG shows normal sinus rhythm, rate 77 beats for minute, normal intervals, normal electrical axis, no acute ST-T wave changes. RADIOLOGY:   Non-plain film images such as CT, Ultrasound and MRI are read by the radiologist. Yasmin Chavarria radiographicimages are visualized and preliminarily interpreted by the emergency physician with the below findings:    Chest x-ray shows no acute cardiopulmonary process. Interpretation per the Radiologist below, if available at the time of this note:    XR CHEST PORTABLE   Final Result   NO ACUTE CARDIOPULMONARY DISEASE.             ED BEDSIDE ULTRASOUND:   Performed by ED Physician - none    LABS:  Labs Reviewed   BASIC METABOLIC PANEL - Abnormal; Notable for the following components:       Result Value    CREATININE 0.34 (*)     All other components within normal limits   CBC WITH AUTO DIFFERENTIAL - Abnormal; Notable for the following components:    WBC 12.7 (*)     RBC 3.96 (*)     Hemoglobin 12.3 (*)     Hematocrit 36.3 (*)     Neutrophils # 8.2 (*)     Monocytes # 1.3 (*)     All other components within normal limits   MAGNESIUM   BRAIN NATRIURETIC PEPTIDE   TROPONIN       All other labs were within normal range or not returned as of this dictation. EMERGENCY DEPARTMENT COURSE and DIFFERENTIALDIAGNOSIS/MDM:  Patient's pulse ox remained above 97% on room air throughout ED course . He remained clinically and hemodynamically stable and comfortable and without any significant symptoms or complaint. Vitals:    Vitals:    04/25/19 1859 04/25/19 1952 04/25/19 2323   BP: (!) 128/90  129/89   Pulse: 84  82   Resp: 20  21   Temp: 98.5 °F (36.9 °C)  98.5 °F (36.9 °C)   TempSrc: Axillary  Oral   SpO2: 93% 96% 98%         MDM  Number of Diagnoses or Management Options     Amount and/or Complexity of Data Reviewed  Clinical lab tests: reviewed and ordered  Tests in the radiology section of CPT®: reviewed and ordered  Tests in the medicine section of CPT®: ordered and reviewed    Risk of Complications, Morbidity, and/or Mortality  Presenting problems: moderate  Diagnostic procedures: moderate  Management options: moderate    Patient Progress  Patient progress: improved      CRITICAL CARE TIME   Total Critical Care time was  minutes, excluding separately reportable procedures. There was a high probability of clinically significant/life threatening deterioration in the patient's condition which required my urgentintervention. CONSULTS:  None    PROCEDURES:  Unless otherwise noted below, none     Procedures    FINAL IMPRESSION      1.  Dyspnea, unspecified type          DISPOSITION/PLAN   DISPOSITION Decision To Discharge 04/25/2019 09:06:20 PM      PATIENT REFERRED TO:  Teddy Feliciano MD  [address]  [phone]    In 3 days        DISCHARGE MEDICATIONS:  Discharge Medication List as of 4/25/2019  9:06 PM             (Please note that portions of this note were completed with a voice recognitionprogram.  Efforts were made to edit the dictations but occasionally words are mis-transcribed.)    Rica Feng MD (electronically signed)  Attending Emergency Physician         Rica Feng MD  04/25/19 P.O. Ty Dailey MD  05/01/19 1356

## 2019-04-26 NOTE — ED NOTES
Called [facility] for transport. ETA 45 min to 1 hour.       Petra Rivera  04/25/19 7548

## 2019-04-29 NOTE — PROGRESS NOTES
PATIENT: Gregory Quintana : 1950 DOS: 2019     RESCARE OF [facility]      HISTORY OF PRESENT ILLNESS: The patient being seen today for acute visit for followup, status post hospital visit for UTI and bronchitis / pneumonia. The patient currently being treated with IV amikacin. Multiple attempts to place IV access were unsuccessful. The paramedics came to establish IV access after this was attempted, still no success at that time. The patient was then sent to the ER to establish IV access for antibiotic treatment. The patient is currently stable. Vital signs are stable. The patient currently has tracheostomy. History of purulent sputum production coming from trach tube. Still having some increased sputum production, however, less purulent in appearance. The patient has severe IDD and cannot take care of himself, is on full penitentiary care. No new additional complaints or concerns. However, we are still concerned for patient's respiratory status. The patient is on O2 with trach. No additional complaints. MEDICATIONS: Reviewed. REVIEW OF SYSTEMS: The patient unable to establish review of systems due to severe IDD. Nursing staff reports no new fever, chills, nausea, vomiting signs. No new rhinorrhea, stuffiness, sneezing, or hoarseness. The patient does not speak, does have some discharge coming from the trach tube. The patient does cough and hack up sputum intermittently. Gets breathing treatments q. 4 hours. No new evidence of chest pain, palpitations. The patient does have minor cough at baseline with shortness of breath. No evident sign of hemoptysis. No new GI complaints or  complaints at this time. The remaining 14-point review of systems unremarkable. VITAL SIGNS: Reviewed on site. PHYSICAL EXAMINATION: GENERAL: The patient displays fair hygiene, flat subdued affect, appears stated age, in no acute distress noted today.  The patient is seated in a wheelchair with trach tube in place and oxygen attached, looking at window. The patient has purposeless movement using a ball to entertain himself. He does not regard me during exam. No new evidence of scars, bumps or deformities to head or neck. Pupils are equal, round, reactive. No injection noted in eyes. Semi dry mucous membranes on oropharyngeal exam. No erythema, exudate or thrush evidence. CARDIAC: Regular rate and rhythm. Negative for leg edema. No JVD. PULMONARY: Lung sound slightly coarse in both lung fields bilaterally as well as some mild to moderate expiratory wheezes bilaterally. SKIN: Grossly intact. No acute evidence of skin breakdown, rashes or bruising. MENTAL STATUS: The patient is awake, semi alert. Oriented to self only. ASSESSMENT AND PLAN:   1. F/u from hospital for pneumonia / bronchitis / UTI  - patient will undergo CBC with diff as well as UA, C&S to follow up for the patient's recent infections and antibiotic treatment. We will monitor closely as patient is stable in recent history. No further orders.           Electronically Signed By: Jeffery Romo PA-C on 04/28/2019 17:39:47  ______________________________  Jeffery Romo PA-C  [id number]  D: 04/27/2019 15:37:29  T: 04/28/2019 01:52:11    cc: Maricruz Roberts

## 2019-05-15 NOTE — PROCEDURES
[facility], [address]                                 PROCEDURE NOTE    PATIENT NAME: Jaden Ramires                        :        1950  MED REC NO:   [id number]                              ROOM:  ACCOUNT NO:   [de-identified]                           ADMIT DATE: 05/15/2019  PROVIDER:     Medhat Callahan MD    DATE OF PROCEDURE:  05/15/2019    PREOPERATIVE DIAGNOSES:  Mechanical complication of gastrostomy tube,  dysphagia. POSTOPERATIVE DIAGNOSES:  Mechanical complication of gastrostomy tube,  dysphagia. OPERATION PERFORMED:  Change of gastrostomy tube. SURGEON:  Medhat Callahan MD    CLINICAL NOTE:  This man receives his medication and nutritional support  through a G-tube, which is starting to breakdown and needs replaced. PROCEDURE NOTE:  He was brought to the same day surgery area. He  remains in his wheelchair. A Painting catheter was in place in the G-tube  site. The balloon was deflated and the tube removed uneventfully. A  new #20-Croatian disc-style tube was lubricated and inserted through the  tract uneventfully. The tube was flushed with return of gastric  contents. The site was dressed with gauze and tape and he left the  suite in a good condition.         Petty Boyer MD    D: 05/15/2019 11:10:25       T: 05/15/2019 14:52:38     /[email or web address]  Job#: [id number]     Doc#: [id number]    CC:

## 2019-05-16 PROBLEM — R13.10 DYSPHAGIA: Status: ACTIVE | Noted: 2019-01-01

## 2019-05-20 NOTE — PROGRESS NOTES
PATIENT: Gillian Spangler : 1950 DOS: 2019     [facility] OF [facility]    Seen today for routine annual visit. HISTORY OF PRESENT ILLNESS: The patient is a recent admission to [address] [place] [address] as of  of this year, under full halfway care. He is completely not able to perform ADLs. The patient has history of severe IDD as well as multiple issues including history of pneumonia, cerebral palsy, history of PE, pseudomonas UTI, sensorineural hearing loss of both ears, constipation, dysphagia, mixed hyperlipidemia, vitamin D deficiency to name a few. The patient currently has a tracheostomy in place and receives oxygen this way. The patient is also on a PEG tube and is n.p.o. at this time. He does not frequently attend workshop due to need for PEG tube and tracheostomy care around-the-clock. The patient is seen today in his room. He is playing with a ball as a mood stabilizing mechanism. He is stable at this point. No acute behaviors or languish behaviors. No self injury, hitting, biting or attacking other residents. The patient's mood is very stable most of the time. ALLERGIES: Noted in chart. He is allergic to ciprofloxacin and Zosyn. REVIEW OF SYSTEMS: Unable to establish due to severe IDD. Per nursing staff no new constitutional issues. Afebrile. No new fever, chills, nausea, vomiting or fatigue increases. The patient is confused at baseline. Does not respond to questioning, does not establish a dialog. HEENT: He does have hearing loss, hoarseness and a wet cough productive of sputum. CARDIOVASCULAR: No evidence of chest pain or palpitations or complaints of some mild to moderate orthopnea when lying. GI: The patient does have a PEG tube in place. : No issues of urgency, polyuria, incontinent of urine. MSK: The patient has gait issues and difficulty ambulating. PSYCHIATRIC: Mood is stable. Some history of behaviors as well.      VITAL SIGNS: The patient's vitals, /56, pulse 74, temperature 96.8, respirations 18, pulse ox 98%. PHYSICAL EXAMINATION: Overall well-developed, very flat affect. No distress at this time except when coughing up sputum where he has difficulty breathing and he seems to get more excited. HEENT: Pupils equal, round, reactive to light. No icterus or injection. Deafness bilaterally. MMM: Some minor erythema on oropharyngeal exam. No exudate, but phlegm production prevalent. Some drooling as well. Trachea midline. Tracheostomy in place. CARDIAC: Shows RRR with intact distal pulses. No lower extremity edema bilaterally. PULMONARY: Shows coarse breath sounds bilaterally likely due to tracheostomy tube placement. Otherwise, mostly clear. ABDOMEN: Normal bowel sounds. Soft and nontender. Slightly hypoactive. MSK: The patient has normal range of motion passive and active, slightly contracted inward and hunched. NEUROLOGICAL: The patient is alert. Normal muscle tone and a slight decrease in coordination and reaction time. SKIN: Warm and dry. Nondiaphoretic at this point. PSYCHIATRIC: Severe IDD. Unable to verbally communicate or make needs known. ASSESSMENT:   1.  IDD.,  2.  Seizure disorder. 3.  GERD. 4.  Spastic diplegic cerebral palsy. 5.  Hypertension. 6.  Mixed hyperlipidemia. 7.  Sensorineural hearing loss. 8.  Vitamin D deficiency. 9.  Age related osteoporosis. 10. Status post tracheostomy. 11. PEG tube therapy. 12. Constipation. 13. History of DVT. 14. Oropharyngeal dysphagia. PLAN:  1. Full penitentiary care, ongoing. 2.  Dr. Pam Barth to follow up for seizure medication adjustments, currently stable without any acute breakthrough seizures. 3.  H2 blocker. 4. Continue tegretol. F/u with neuro prn.  5. VSS, maintain current meds. 6. Routine labs and low dose atorvastatin  7. NNO. Pt does not converse. Can be alerted visually. 8. Routine labs and vit d supplementation. 9. No acute pain complaints, very limited mobility.  PRN pain meds as required for evident signs of discomfort. 10. Patent airway, phlegm production. F/u with pulmonology for trach maintenance. 11. No changes; maintain current rate/flush orders. 12. NNO issues currently. PRN meds in place. 13. Anticoagulation therapy. 14. Exclusive PEG tube. No current issues.                                Electronically Signed By: Cathy Castorena PA-C on 05/20/2019 12:03:09  ______________________________  Cathy Castorena PA-C  /[id number]  D: 05/19/2019 22:22:18  T: 05/19/2019 23:34:44    cc: Maricruz Back

## 2019-05-21 PROBLEM — Z93.1 G TUBE FEEDINGS (HCC): Status: ACTIVE | Noted: 2019-01-01

## 2019-07-12 NOTE — ED NOTES
Attempt to call pt sister, left message. Pt sister had stated she would arrive around 1330.        Wanda Guerrero RN  07/12/19 7254

## 2019-07-12 NOTE — ED NOTES
[facility]ui Sprain and sons  home contacted at request of pt family to   pt. He reports 1 hour arrival and workers will wait till family is ready.       Wanda Guerrero RN  [phone]

## 2019-07-12 NOTE — ED NOTES
300ml of Normal Saline infused and epinephrine x 3 from time of EMS arrival to time of death. Pt skin intact with exception  of abrasions from Ac Amel device.   Skin around G tube is not reddened and appears to be in good condition      Jazzmine Siddiqui RN  07/12/19 1069

## 2019-07-12 NOTE — ED NOTES
Pt sister Garfield Robbins and  arrive, escorted to room. Spiritual care called at request of pt sister. Discussed events of pt demise with sister. Sister is tearful but calm and accepting.       Nabeel Camarena RN  07/12/19 0609

## 2019-07-12 NOTE — ED NOTES
Spoke with Sotero Martinez RN at [employer], She reports she has called family and left messages with family on [phone] Mandi Hayes RN  07/12/19 1217

## 2019-07-12 NOTE — ED NOTES
Call from Diane Goldsmith at [facility], pt is released for donation.   No organs or tissues to be harvested      Madie Walters RN  07/12/19 7581

## 2019-07-12 NOTE — ED NOTES
Bed: 02  Expected date:   Expected time:   Means of arrival:   Comments:  SLCAD  49 YO male from [facility] full code trach, IO epi x ROSAURA Silva  07/12/19 5223

## 2019-08-21 NOTE — PROGRESS NOTES
patient is deaf. Moist mucous membranes. Some minor erythema on oropharyngeal examination. No thrush or exudate. Some clear/grayish phlegm noted upon suction. The patient has some drooling. Tracheostomy site clean and dry area in the neck. CARDIAC:  Regular rate and rhythm. No lower extremity edema noted. Intact pedal pulse bilaterally. PULMONARY:  Some coarse breath sounds in upper lung fields, likely due to tracheostomy. Clear lower lung sounds at this time. Diminished effort and poor command following due to IDD. ABDOMINAL:  Normal bowel sounds. Soft and nontender. MSK:  Patient's passive range of motion is within normal limits. Poor strength, poor command following. PSYCHIATRIC:  Patient's mood is overall about the same. The patient does have severe IDD and is unable to verbally communicate or make needs known. ASSESSMENT AND PLAN:  1. History of dysphagia - no new complaints per nursing staff. Lung sounds clear at this time. We will follow up if any acute issues arise. The patient does not receive any food by mouth. 2.   PEG tube therapy - patient receives PEG tube without issue. Will maintain current right and feeding type due to tolerance of therapy. 3.   History of DVT - patient's pedal pulses are patent. No acute issues at this time. We will follow up if any acute limb ischemia or other abnormalities.         Electronically Signed By: Melida Granda PA-C on 08/05/2019 22:26:19  ______________________________  Melida Granda PA-C  /[id number]  D: 08/04/2019 22:57:54  T: 08/04/2019 23:25:17    cc: - Roberto Tyson

## 2021-12-09 NOTE — DISCHARGE SUMMARY
acetaminophen (TYLENOL) suppository 650 mg  650 mg Rectal Q4H PRN Luis Felipe Abhi, PA-C        atorvastatin (LIPITOR) tablet 10 mg  10 mg Per G Tube Daily Luis Felipe Abhi, PA-C   10 mg at 04/06/19 0844    famotidine (PEPCID) tablet 20 mg  20 mg Per G Tube BID Luis Felipe Abhi, PA-C   20 mg at 04/07/19 2117    glycopyrrolate (ROBINUL) tablet 1 mg  1 mg Per G Tube BID Luis Felipe Abhi, PA-C   1 mg at 04/07/19 2117    calcium elemental (OSCAL) tablet 500 mg  500 mg Per G Tube BID Luis Felipe Abhi, PA-C   500 mg at 04/07/19 2117    bisacodyl (DULCOLAX) suppository 10 mg  10 mg Rectal Daily PRN Luis Felipe Abhi, PA-C        predniSONE (DELTASONE) tablet 10 mg  10 mg Oral Daily Luis Felipe Abhi, PA-C   10 mg at 04/06/19 0844    sennosides-docusate sodium (SENOKOT-S) 8.6-50 MG tablet 2 tablet  2 tablet Per G Tube Daily Luis Felipe Abhi, PA-C   2 tablet at 04/06/19 0844    vitamin D (CHOLECALCIFEROL) capsule 5,000 Units  5,000 Units Per G Tube Daily Luis Felipe Abhi, PA-C   5,000 Units at 04/06/19 9515    CENTRUM/CERTA-NICOLE with minerals oral solution 15 mL  15 mL Per G Tube Daily Luis Felipe Abhi, PA-C   15 mL at 04/06/19 0844    gabapentin (NEURONTIN) capsule 400 mg  400 mg Per G Tube TID Luis Felipe Abhi, PA-C   400 mg at 04/07/19 2117    polyethylene glycol (GLYCOLAX) packet 17 g  17 g Oral Daily Luis Felipe Abhi, PA-C   17 g at 04/06/19 0844    carBAMazepine (TEGRETOL) 100 MG/5ML suspension 400 mg  400 mg Per G Tube Daily Luis Felipe Abhi, PA-C   Stopped at 04/07/19 0820         Discharge Exam:  HEENT: AT/NC, PERRLA, no JVD  HEART: s1/s2 wnl w/o s3  LUNG: clear  ABD: soft, NT, + PEG tube  EXT: no edema  SKin : no rash  Neuro:no focal deficits      Disposition: Res care    Patient Instructions:      Activity:as tolerted  Diet: peg feeding    Follow-up with PCP in 1 week  Overtime on dc summary was 45 min  Amikacin for 2 more days as per ID recommendation     Medication List      CONTINUE taking these medications acetaminophen 325 MG tablet  Commonly known as:  TYLENOL     albuterol (2.5 MG/3ML) 0.083% nebulizer solution  Commonly known as:  PROVENTIL  Take 3 mLs by nebulization three times daily     atorvastatin 10 MG tablet  Commonly known as:  LIPITOR     bisacodyl 10 MG suppository  Commonly known as:  DULCOLAX     * carBAMazepine 100 MG/5ML suspension  Commonly known as:  TEGRETOL     * carBAMazepine 100 MG/5ML suspension  Commonly known as:  TEGRETOL     denosumab 60 MG/ML Soln SC injection  Commonly known as:  PROLIA     famotidine 20 MG tablet  Commonly known as:  PEPCID     gabapentin 400 MG capsule  Commonly known as:  NEURONTIN  1 capsule by Per G Tube route 3 times daily for 30 days. Carol Givens glycopyrrolate 1 MG tablet  Commonly known as:  ROBINUL     Oyster Shell 500 MG Tabs     pantoprazole sodium 40 MG Pack packet  Commonly known as:  PROTONIX  Take 1 packet by mouth every morning (before breakfast)     polyethylene glycol powder  Commonly known as:  GLYCOLAX     predniSONE 10 MG tablet  Commonly known as:  DELTASONE     sennosides-docusate sodium 8.6-50 MG tablet  Commonly known as:  SENOKOT-S     therapeutic multivitamin-minerals tablet     vitamin D 1000 UNIT Tabs tablet  Commonly known as:  CHOLECALCIFEROL         * This list has 2 medication(s) that are the same as other medications prescribed for you. Read the directions carefully, and ask your doctor or other care provider to review them with you.                 SignedArlygriselda Quispe  4/8/2019  8:46 AM 92

## 2023-04-19 NOTE — ED NOTES
Urine pulled from proximal port of jennings catheter. Specimen sent.      Magali Valentine RN  04/02/19 6255 Yes